# Patient Record
Sex: MALE | Race: BLACK OR AFRICAN AMERICAN | NOT HISPANIC OR LATINO | Employment: OTHER | URBAN - METROPOLITAN AREA
[De-identification: names, ages, dates, MRNs, and addresses within clinical notes are randomized per-mention and may not be internally consistent; named-entity substitution may affect disease eponyms.]

---

## 2023-11-29 ENCOUNTER — HOSPITAL ENCOUNTER (EMERGENCY)
Facility: HOSPITAL | Age: 58
Discharge: HOME | End: 2023-11-29
Payer: COMMERCIAL

## 2023-11-29 VITALS
RESPIRATION RATE: 18 BRPM | WEIGHT: 206 LBS | SYSTOLIC BLOOD PRESSURE: 151 MMHG | BODY MASS INDEX: 31.22 KG/M2 | OXYGEN SATURATION: 96 % | DIASTOLIC BLOOD PRESSURE: 96 MMHG | TEMPERATURE: 97 F | HEART RATE: 88 BPM | HEIGHT: 68 IN

## 2023-11-29 DIAGNOSIS — K64.9 HEMORRHOIDS, UNSPECIFIED HEMORRHOID TYPE: Primary | ICD-10-CM

## 2023-11-29 PROCEDURE — 99283 EMERGENCY DEPT VISIT LOW MDM: CPT

## 2023-11-29 PROCEDURE — 99284 EMERGENCY DEPT VISIT MOD MDM: CPT

## 2023-11-29 RX ORDER — DICYCLOMINE HYDROCHLORIDE 10 MG/1
10 CAPSULE ORAL ONCE
Status: DISCONTINUED | OUTPATIENT
Start: 2023-11-29 | End: 2023-11-29 | Stop reason: HOSPADM

## 2023-11-29 RX ORDER — DICYCLOMINE HYDROCHLORIDE 20 MG/1
10 TABLET ORAL
Qty: 30 TABLET | Refills: 0 | Status: SHIPPED | OUTPATIENT
Start: 2023-11-29

## 2023-11-29 RX ORDER — POLYETHYLENE GLYCOL 3350 17 G/17G
17 POWDER, FOR SOLUTION ORAL DAILY
Qty: 10 PACKET | Refills: 0 | Status: SHIPPED | OUTPATIENT
Start: 2023-11-29 | End: 2023-12-09

## 2023-11-29 ASSESSMENT — PAIN DESCRIPTION - LOCATION: LOCATION: ABDOMEN

## 2023-11-29 ASSESSMENT — COLUMBIA-SUICIDE SEVERITY RATING SCALE - C-SSRS
2. HAVE YOU ACTUALLY HAD ANY THOUGHTS OF KILLING YOURSELF?: NO
6. HAVE YOU EVER DONE ANYTHING, STARTED TO DO ANYTHING, OR PREPARED TO DO ANYTHING TO END YOUR LIFE?: NO
1. IN THE PAST MONTH, HAVE YOU WISHED YOU WERE DEAD OR WISHED YOU COULD GO TO SLEEP AND NOT WAKE UP?: NO

## 2023-11-29 ASSESSMENT — PAIN SCALES - GENERAL: PAINLEVEL_OUTOF10: 5 - MODERATE PAIN

## 2023-11-29 ASSESSMENT — PAIN - FUNCTIONAL ASSESSMENT: PAIN_FUNCTIONAL_ASSESSMENT: 0-10

## 2023-11-29 NOTE — ED PROVIDER NOTES
HPI   Chief Complaint   Patient presents with    Black or Bloody Stool       Limitations to History: None    HPI: This is a 58-year-old male with a past medical history of hypertension with poor medication compliance, alcohol abuse who presents to the emergency room with concerns for abdominal pain and rectal bleeding.  Patient states that he has been pushing really hard for his bowel movements over the last 3 to 4 days.  Patient states that on 2 separate occasions, he has noticed blood when wiping.  Patient endorses some mild abdominal pain without nausea or vomiting.  Patient denies any urinary symptoms such as frequency, hesitancy, dysuria, or hematuria.  Patient denies any constitutional symptoms such as fevers, chills, cough, or nasal congestion.    Additional History Obtained from: None    12 point review of systems was performed and is negative unless otherwise specified    ------------------------------------------------------------------------------------------------------------------------------------------  Physical Exam:    VS: As documented in the triage note and EMR flowsheet from this visit were reviewed.    CONSTITUTIONAL: Well appearing, well nourished, awake, alert, oriented to person, place, time/situation and in no apparent distress.   EYES: Clear bilaterally, pupils equal, round and reactive to light.   CARDIOVASCULAR: Normal rate, regular rhythm.  Heart sounds S1, S2.  No murmurs, rubs or gallops.  RESPIRATORY: Breath sounds clear and equal bilaterally. No wheezes or rhonchi.   GASTROINTESTINAL: Abdomen soft, non-distended, no rebound, no guarding.  No tenderness to palpation in all 4 quadrants.  No suprapubic or CVA tenderness.  Negative McBurney's point, Rovsing, psoas, Flores sign.  A rectal exam was performed on patient.  Patient has normal external rectal anatomy without fissures or tears.  Patient did refuse the digital manual exam.  MUSCULOSKELETAL: Spine appears normal, range of motion  is not limited, no muscle or joint tenderness.   NEUROLOGICAL: Alert and oriented, no focal deficits, no motor or sensory deficits.   SKIN: Skin normal color for race, warm, dry and intact. No evidence of trauma.   PSYCHIATRIC: Alert and oriented to person, place, time/situation. normal mood and affect. No apparent risk to self or others.     ------------------------------------------------------------------------------------------------------------------------------------------    Medical Decision Making:    This is a 58-year-old male who presents to the emergency room with 2 episodes of bright red blood per rectum.  Patient remained stable throughout course of care.  Patient did refuse the rectal exam which is limiting to patient's overall care.  Abdominal exam is benign lowers my concern for cholecystitis, pancreatitis, appendicitis, or bowel obstruction.  I did consider a CT angio to evaluate for a GI bleed, however based on my abdominal exam and rectal exam, I have lower concern for brisk GI bleed.  Based on patient's history, it is likely that patient has developed hemorrhoids from excessive straining.  Patient could also have constipation, leading to excessive wiping which leads to bleeding.  In either scenario, I do find it unlikely that patient has developed a brisk GI bleed.  Patient was administered 1 dose of Bentyl.  Patient is able to ambulate, responds to commands, engages in conversation, and understands his clinical presentation.  In my evaluation, this patient does have decision-making capacity.  Patient was reassessed and did have an improvement of symptoms.  Patient will be discharged home with prescriptions for Bentyl and MiraLAX.  I did offer patient hemorrhoid cream but patient refused, stating that he does not have a hemorrhoid.  I did recommend outpatient follow-up with a GI specialist if there is no improvement of symptoms.  Patient will be discharged home.  Patient understands that if  symptoms worsen or change in any way, they should return for immediate medical attention.  Patient understands that they should follow-up with internal hemorrhoid.  Patient was stable upon discharge.      External Records Reviewed: I reviewed recent and relevant outside records including: Previous provider notes      Escalation of Care:  Appropriate for outpatient follow-up with primary care provider, gastroenterologist    Social Determinants Affecting Care:  None    Prescription Drug Consideration: Bentyl, MiraLAX    Diagnostic testing considered: CT angio of the abdomen to evaluate for GI bleed      NEIL Pradhan PA-C  [facility]                            Jon Coma Scale Score: 15                  Patient History   Past Medical History:   Diagnosis Date    Hypertension      History reviewed. No pertinent surgical history.  No family history on file.  Social History     Tobacco Use    Smoking status: Never    Smokeless tobacco: Never   Substance Use Topics    Alcohol use: Not on file    Drug use: Not on file       Physical Exam   ED Triage Vitals [11/29/23 1216]   Temp Heart Rate Resp BP   36.1 °C (97 °F) 88 18 (!) 151/96      SpO2 Temp src Heart Rate Source Patient Position   96 % -- -- --      BP Location FiO2 (%)     -- --       Physical Exam    ED Course & MDM   Diagnoses as of 11/29/23 1426   Hemorrhoids, unspecified hemorrhoid type       Medical Decision Making      Procedure  Procedures     Dima Adames PA-C  11/29/23 1421       Dima Adames PA-C  11/29/23 1426

## 2023-11-30 NOTE — ED NOTES
[facility] ED NOTE 11/30/23  Patient is being provided an PCP appointment for 12/27/23 @ 1p  [facility]  With Dr. Steve Garcia  [phone]    Patient has appointment reminders on his profile     [facility]  11/30/23 1224

## 2023-12-27 ENCOUNTER — APPOINTMENT (OUTPATIENT)
Dept: PRIMARY CARE | Facility: HOSPITAL | Age: 58
End: 2023-12-27
Payer: COMMERCIAL

## 2024-02-20 ENCOUNTER — APPOINTMENT (OUTPATIENT)
Dept: GASTROENTEROLOGY | Facility: HOSPITAL | Age: 59
End: 2024-02-20
Payer: MEDICAID

## 2025-06-15 ENCOUNTER — HOSPITAL ENCOUNTER (EMERGENCY)
Facility: HOSPITAL | Age: 60
Discharge: HOME | End: 2025-06-15
Payer: MEDICAID

## 2025-06-15 ENCOUNTER — APPOINTMENT (OUTPATIENT)
Dept: RADIOLOGY | Facility: HOSPITAL | Age: 60
End: 2025-06-15
Payer: MEDICAID

## 2025-06-15 VITALS
OXYGEN SATURATION: 99 % | HEART RATE: 84 BPM | RESPIRATION RATE: 16 BRPM | TEMPERATURE: 97.2 F | DIASTOLIC BLOOD PRESSURE: 81 MMHG | SYSTOLIC BLOOD PRESSURE: 128 MMHG

## 2025-06-15 DIAGNOSIS — M25.571 ACUTE RIGHT ANKLE PAIN: Primary | ICD-10-CM

## 2025-06-15 PROCEDURE — 73610 X-RAY EXAM OF ANKLE: CPT | Mod: RT

## 2025-06-15 PROCEDURE — 73610 X-RAY EXAM OF ANKLE: CPT | Mod: RIGHT SIDE | Performed by: RADIOLOGY

## 2025-06-15 PROCEDURE — 99284 EMERGENCY DEPT VISIT MOD MDM: CPT

## 2025-06-15 PROCEDURE — 2500000001 HC RX 250 WO HCPCS SELF ADMINISTERED DRUGS (ALT 637 FOR MEDICARE OP)

## 2025-06-15 PROCEDURE — 99283 EMERGENCY DEPT VISIT LOW MDM: CPT

## 2025-06-15 RX ORDER — IBUPROFEN 600 MG/1
600 TABLET, FILM COATED ORAL ONCE
Status: DISCONTINUED | OUTPATIENT
Start: 2025-06-15 | End: 2025-06-15 | Stop reason: HOSPADM

## 2025-06-15 NOTE — ED PROVIDER NOTES
History of Present Illness       History provided by: Patient  Limitations to History: None  External Records Reviewed with Brief Summary: notes reviewed in patients chart    HPI:  HPI     This is a 6-year-old male with past medical history of hypertension presenting to the ED for right ankle pain that began yesterday around 6 PM.  Patient states he was with his grandchildren trying to fly kite when he twisted his ankle awkwardly developing pain.  States he did take Tylenol earlier today.  Denies pain within the digit of the right foot as well as the right lower and upper leg.  Denies chest pain, shortness of breath, fever, chills, nausea, vomiting.  Patient able to ambulate although fevers that leg.    Physical Exam   Physical Exam  Constitutional:       Appearance: Normal appearance.   HENT:      Head: Normocephalic.      Nose: Nose normal.      Mouth/Throat:      Mouth: Mucous membranes are moist.   Eyes:      Extraocular Movements: Extraocular movements intact.   Cardiovascular:      Rate and Rhythm: Normal rate and regular rhythm.   Pulmonary:      Effort: Pulmonary effort is normal.      Breath sounds: Normal breath sounds.   Musculoskeletal:      Cervical back: Normal range of motion.      Comments: Right ankle slightly tender to palpation along the lateral, anterior, medial portion with mild swelling.  Palpable pulses.  Skin intact.  No palpable deformities.  Normal ROM of digits, right ankle slightly limited due to pain. Compartments soft    Skin:     General: Skin is warm.   Neurological:      General: No focal deficit present.      Mental Status: He is alert and oriented to person, place, and time.   Psychiatric:         Mood and Affect: Mood normal.         Behavior: Behavior normal.         Thought Content: Thought content normal.          Triage vitals:  T 36.2 °C (97.2 °F)  HR 84  /81  RR 16  O2 99 % None (Room air)    Medical Decision Making & ED Course     ED Course & MDM       Medical  Decision Making:  Medical Decision Making  This is a 6-year-old male with past medical history of hypertension presenting to the ED for right ankle pain that began yesterday around 6 PM.    Upon arrival to the ED patient is alert and oriented in no acute distress, vitally stable and afebrile.  On exam patient does have a slightly swollen right ankle, tender to palpation over the lateral, anterior, medial aspect.  Skin intact.  ROM slightly limited due to pain.  Compartments soft.    Patient offered ibuprofen, declined.  Right ankle x-ray without acute bony abnormality.    Patient discharged in stable condition, discussed ankle sprain, with Ace wrap applied over the ankle.  Advised patient to take Tylenol/ibuprofen as needed for pain management.  Wear the Ace wrap for compression.  Apply ice and elevate the ankle.  Follow-up with orthopedic team.  Return to ED for new or worsening of symptoms.        Visit Vitals  /81   Pulse 84   Temp 36.2 °C (97.2 °F) (Temporal)   Resp 16   SpO2 99%   Smoking Status Never        Labs Reviewed - No data to display    No orders to display       ED Course:  Diagnoses as of 06/15/25 1147   Acute right ankle pain     Disposition     As a result of the work-up, the patient was discharged home.  he was informed of his diagnosis and instructed to come back with any concerns or worsening of condition.  he was agreeable to the plan as discussed above.  he was given the opportunity to ask questions.  All of the patient's questions were answered.      Procedures   Procedures    Patient was seen independently    Amarilys Dumont PA-C  Emergency Medicine      Amarilys Dumont PA-C  06/15/25 1526

## 2025-06-15 NOTE — DISCHARGE INSTRUCTIONS
You were seen here for right ankle pain.  X-ray does not demonstrate any acute fracture or bony abnormality.  I recommend that you take Tylenol/ibuprofen as needed for pain management.  Rest and ice the ankle, apply Ace wrap for compression, stay well-hydrated.  Follow-up with orthopedic team.  Return to ED for new or worsening of symptoms.

## 2025-07-19 ENCOUNTER — CLINICAL SUPPORT (OUTPATIENT)
Dept: EMERGENCY MEDICINE | Facility: HOSPITAL | Age: 60
End: 2025-07-19
Payer: COMMERCIAL

## 2025-07-19 ENCOUNTER — APPOINTMENT (OUTPATIENT)
Dept: RADIOLOGY | Facility: HOSPITAL | Age: 60
End: 2025-07-19
Payer: COMMERCIAL

## 2025-07-19 ENCOUNTER — HOSPITAL ENCOUNTER (INPATIENT)
Facility: HOSPITAL | Age: 60
LOS: 1 days | Discharge: HOME | End: 2025-07-20
Attending: STUDENT IN AN ORGANIZED HEALTH CARE EDUCATION/TRAINING PROGRAM | Admitting: INTERNAL MEDICINE
Payer: COMMERCIAL

## 2025-07-19 DIAGNOSIS — R10.33 PERIUMBILICAL ABDOMINAL PAIN: ICD-10-CM

## 2025-07-19 DIAGNOSIS — F10.90 ALCOHOL USE: ICD-10-CM

## 2025-07-19 DIAGNOSIS — K86.1 ACUTE ON CHRONIC PANCREATITIS (MULTI): Primary | ICD-10-CM

## 2025-07-19 DIAGNOSIS — K85.90 ACUTE ON CHRONIC PANCREATITIS (MULTI): Primary | ICD-10-CM

## 2025-07-19 LAB
ALBUMIN SERPL BCP-MCNC: 4.4 G/DL (ref 3.4–5)
ALP SERPL-CCNC: 108 U/L (ref 33–136)
ALT SERPL W P-5'-P-CCNC: 21 U/L (ref 10–52)
ANION GAP SERPL CALC-SCNC: 17 MMOL/L (ref 10–20)
AST SERPL W P-5'-P-CCNC: 32 U/L (ref 9–39)
BASOPHILS # BLD AUTO: 0.03 X10*3/UL (ref 0–0.1)
BASOPHILS NFR BLD AUTO: 0.3 %
BILIRUB SERPL-MCNC: 1.3 MG/DL (ref 0–1.2)
BUN SERPL-MCNC: 9 MG/DL (ref 6–23)
CALCIUM SERPL-MCNC: 9.3 MG/DL (ref 8.6–10.6)
CHLORIDE SERPL-SCNC: 99 MMOL/L (ref 98–107)
CO2 SERPL-SCNC: 25 MMOL/L (ref 21–32)
CREAT SERPL-MCNC: 1.21 MG/DL (ref 0.5–1.3)
EGFRCR SERPLBLD CKD-EPI 2021: 69 ML/MIN/1.73M*2
EOSINOPHIL # BLD AUTO: 0 X10*3/UL (ref 0–0.7)
EOSINOPHIL NFR BLD AUTO: 0 %
ERYTHROCYTE [DISTWIDTH] IN BLOOD BY AUTOMATED COUNT: 12.1 % (ref 11.5–14.5)
GLUCOSE SERPL-MCNC: 159 MG/DL (ref 74–99)
HCT VFR BLD AUTO: 33.5 % (ref 41–52)
HGB BLD-MCNC: 12.2 G/DL (ref 13.5–17.5)
IMM GRANULOCYTES # BLD AUTO: 0.03 X10*3/UL (ref 0–0.7)
IMM GRANULOCYTES NFR BLD AUTO: 0.3 % (ref 0–0.9)
LACTATE BLDV-SCNC: 1.2 MMOL/L (ref 0.4–2)
LIPASE SERPL-CCNC: 516 U/L (ref 9–82)
LYMPHOCYTES # BLD AUTO: 0.61 X10*3/UL (ref 1.2–4.8)
LYMPHOCYTES NFR BLD AUTO: 6.2 %
MAGNESIUM SERPL-MCNC: 1.54 MG/DL (ref 1.6–2.4)
MCH RBC QN AUTO: 32.9 PG (ref 26–34)
MCHC RBC AUTO-ENTMCNC: 36.4 G/DL (ref 32–36)
MCV RBC AUTO: 90 FL (ref 80–100)
MONOCYTES # BLD AUTO: 0.33 X10*3/UL (ref 0.1–1)
MONOCYTES NFR BLD AUTO: 3.3 %
NEUTROPHILS # BLD AUTO: 8.87 X10*3/UL (ref 1.2–7.7)
NEUTROPHILS NFR BLD AUTO: 89.9 %
NRBC BLD-RTO: 0 /100 WBCS (ref 0–0)
PHOSPHATE SERPL-MCNC: 3.4 MG/DL (ref 2.5–4.9)
PLATELET # BLD AUTO: 234 X10*3/UL (ref 150–450)
POTASSIUM SERPL-SCNC: 2.6 MMOL/L (ref 3.5–5.3)
PROT SERPL-MCNC: 7.7 G/DL (ref 6.4–8.2)
RBC # BLD AUTO: 3.71 X10*6/UL (ref 4.5–5.9)
SODIUM SERPL-SCNC: 138 MMOL/L (ref 136–145)
WBC # BLD AUTO: 9.9 X10*3/UL (ref 4.4–11.3)

## 2025-07-19 PROCEDURE — 96365 THER/PROPH/DIAG IV INF INIT: CPT

## 2025-07-19 PROCEDURE — 96375 TX/PRO/DX INJ NEW DRUG ADDON: CPT

## 2025-07-19 PROCEDURE — 96368 THER/DIAG CONCURRENT INF: CPT

## 2025-07-19 PROCEDURE — 74177 CT ABD & PELVIS W/CONTRAST: CPT | Performed by: RADIOLOGY

## 2025-07-19 PROCEDURE — 80053 COMPREHEN METABOLIC PANEL: CPT | Performed by: STUDENT IN AN ORGANIZED HEALTH CARE EDUCATION/TRAINING PROGRAM

## 2025-07-19 PROCEDURE — 83735 ASSAY OF MAGNESIUM: CPT | Performed by: STUDENT IN AN ORGANIZED HEALTH CARE EDUCATION/TRAINING PROGRAM

## 2025-07-19 PROCEDURE — 2500000001 HC RX 250 WO HCPCS SELF ADMINISTERED DRUGS (ALT 637 FOR MEDICARE OP): Performed by: STUDENT IN AN ORGANIZED HEALTH CARE EDUCATION/TRAINING PROGRAM

## 2025-07-19 PROCEDURE — 96365 THER/PROPH/DIAG IV INF INIT: CPT | Mod: 59

## 2025-07-19 PROCEDURE — 93010 ELECTROCARDIOGRAM REPORT: CPT | Performed by: STUDENT IN AN ORGANIZED HEALTH CARE EDUCATION/TRAINING PROGRAM

## 2025-07-19 PROCEDURE — 2500000004 HC RX 250 GENERAL PHARMACY W/ HCPCS (ALT 636 FOR OP/ED)

## 2025-07-19 PROCEDURE — 96376 TX/PRO/DX INJ SAME DRUG ADON: CPT

## 2025-07-19 PROCEDURE — 99223 1ST HOSP IP/OBS HIGH 75: CPT

## 2025-07-19 PROCEDURE — 96376 TX/PRO/DX INJ SAME DRUG ADON: CPT | Mod: 59

## 2025-07-19 PROCEDURE — 82248 BILIRUBIN DIRECT: CPT

## 2025-07-19 PROCEDURE — 84100 ASSAY OF PHOSPHORUS: CPT | Performed by: STUDENT IN AN ORGANIZED HEALTH CARE EDUCATION/TRAINING PROGRAM

## 2025-07-19 PROCEDURE — 1200000002 HC GENERAL ROOM WITH TELEMETRY DAILY

## 2025-07-19 PROCEDURE — 93005 ELECTROCARDIOGRAM TRACING: CPT

## 2025-07-19 PROCEDURE — 99285 EMERGENCY DEPT VISIT HI MDM: CPT | Performed by: STUDENT IN AN ORGANIZED HEALTH CARE EDUCATION/TRAINING PROGRAM

## 2025-07-19 PROCEDURE — 2500000004 HC RX 250 GENERAL PHARMACY W/ HCPCS (ALT 636 FOR OP/ED): Mod: JZ | Performed by: STUDENT IN AN ORGANIZED HEALTH CARE EDUCATION/TRAINING PROGRAM

## 2025-07-19 PROCEDURE — 1210000001 HC SEMI-PRIVATE ROOM DAILY

## 2025-07-19 PROCEDURE — 83690 ASSAY OF LIPASE: CPT | Performed by: STUDENT IN AN ORGANIZED HEALTH CARE EDUCATION/TRAINING PROGRAM

## 2025-07-19 PROCEDURE — 2500000005 HC RX 250 GENERAL PHARMACY W/O HCPCS: Performed by: STUDENT IN AN ORGANIZED HEALTH CARE EDUCATION/TRAINING PROGRAM

## 2025-07-19 PROCEDURE — 2550000001 HC RX 255 CONTRASTS: Performed by: STUDENT IN AN ORGANIZED HEALTH CARE EDUCATION/TRAINING PROGRAM

## 2025-07-19 PROCEDURE — 99285 EMERGENCY DEPT VISIT HI MDM: CPT | Mod: 25 | Performed by: STUDENT IN AN ORGANIZED HEALTH CARE EDUCATION/TRAINING PROGRAM

## 2025-07-19 PROCEDURE — 83605 ASSAY OF LACTIC ACID: CPT | Performed by: STUDENT IN AN ORGANIZED HEALTH CARE EDUCATION/TRAINING PROGRAM

## 2025-07-19 PROCEDURE — 85025 COMPLETE CBC W/AUTO DIFF WBC: CPT | Performed by: STUDENT IN AN ORGANIZED HEALTH CARE EDUCATION/TRAINING PROGRAM

## 2025-07-19 PROCEDURE — 96366 THER/PROPH/DIAG IV INF ADDON: CPT

## 2025-07-19 PROCEDURE — 2500000002 HC RX 250 W HCPCS SELF ADMINISTERED DRUGS (ALT 637 FOR MEDICARE OP, ALT 636 FOR OP/ED): Performed by: STUDENT IN AN ORGANIZED HEALTH CARE EDUCATION/TRAINING PROGRAM

## 2025-07-19 PROCEDURE — 36415 COLL VENOUS BLD VENIPUNCTURE: CPT | Performed by: STUDENT IN AN ORGANIZED HEALTH CARE EDUCATION/TRAINING PROGRAM

## 2025-07-19 PROCEDURE — 74177 CT ABD & PELVIS W/CONTRAST: CPT

## 2025-07-19 PROCEDURE — 96374 THER/PROPH/DIAG INJ IV PUSH: CPT | Mod: 59

## 2025-07-19 RX ORDER — POLYETHYLENE GLYCOL 3350 17 G/17G
17 POWDER, FOR SOLUTION ORAL DAILY PRN
Status: DISCONTINUED | OUTPATIENT
Start: 2025-07-19 | End: 2025-07-20 | Stop reason: HOSPADM

## 2025-07-19 RX ORDER — MORPHINE SULFATE 4 MG/ML
4 INJECTION INTRAVENOUS ONCE
Status: COMPLETED | OUTPATIENT
Start: 2025-07-19 | End: 2025-07-19

## 2025-07-19 RX ORDER — ENOXAPARIN SODIUM 100 MG/ML
40 INJECTION SUBCUTANEOUS EVERY 24 HOURS
Status: DISCONTINUED | OUTPATIENT
Start: 2025-07-20 | End: 2025-07-20 | Stop reason: HOSPADM

## 2025-07-19 RX ORDER — PROCHLORPERAZINE MALEATE 10 MG
10 TABLET ORAL EVERY 6 HOURS PRN
Status: DISCONTINUED | OUTPATIENT
Start: 2025-07-19 | End: 2025-07-20 | Stop reason: HOSPADM

## 2025-07-19 RX ORDER — ONDANSETRON HYDROCHLORIDE 2 MG/ML
4 INJECTION, SOLUTION INTRAVENOUS ONCE
Status: COMPLETED | OUTPATIENT
Start: 2025-07-19 | End: 2025-07-19

## 2025-07-19 RX ORDER — ONDANSETRON 4 MG/1
4 TABLET, FILM COATED ORAL EVERY 6 HOURS PRN
Status: DISCONTINUED | OUTPATIENT
Start: 2025-07-19 | End: 2025-07-20 | Stop reason: HOSPADM

## 2025-07-19 RX ORDER — OXYCODONE HYDROCHLORIDE 5 MG/1
5 TABLET ORAL EVERY 6 HOURS PRN
Status: DISCONTINUED | OUTPATIENT
Start: 2025-07-19 | End: 2025-07-20 | Stop reason: HOSPADM

## 2025-07-19 RX ORDER — ALUMINUM HYDROXIDE, MAGNESIUM HYDROXIDE, AND SIMETHICONE 1200; 120; 1200 MG/30ML; MG/30ML; MG/30ML
30 SUSPENSION ORAL ONCE
Status: COMPLETED | OUTPATIENT
Start: 2025-07-19 | End: 2025-07-19

## 2025-07-19 RX ORDER — POTASSIUM CHLORIDE 20 MEQ/1
40 TABLET, EXTENDED RELEASE ORAL ONCE
Status: DISCONTINUED | OUTPATIENT
Start: 2025-07-19 | End: 2025-07-19

## 2025-07-19 RX ORDER — FAMOTIDINE 20 MG/1
20 TABLET, FILM COATED ORAL 2 TIMES DAILY
Status: DISCONTINUED | OUTPATIENT
Start: 2025-07-20 | End: 2025-07-20

## 2025-07-19 RX ORDER — LIDOCAINE HYDROCHLORIDE 20 MG/ML
15 SOLUTION OROPHARYNGEAL ONCE
Status: COMPLETED | OUTPATIENT
Start: 2025-07-19 | End: 2025-07-19

## 2025-07-19 RX ORDER — AMLODIPINE BESYLATE 10 MG/1
10 TABLET ORAL DAILY
Status: DISCONTINUED | OUTPATIENT
Start: 2025-07-20 | End: 2025-07-20 | Stop reason: HOSPADM

## 2025-07-19 RX ORDER — FAMOTIDINE 10 MG/ML
20 INJECTION, SOLUTION INTRAVENOUS ONCE
Status: COMPLETED | OUTPATIENT
Start: 2025-07-19 | End: 2025-07-19

## 2025-07-19 RX ORDER — ACETAMINOPHEN 325 MG/1
975 TABLET ORAL EVERY 8 HOURS
Status: DISCONTINUED | OUTPATIENT
Start: 2025-07-20 | End: 2025-07-20 | Stop reason: HOSPADM

## 2025-07-19 RX ORDER — PROCHLORPERAZINE EDISYLATE 5 MG/ML
10 INJECTION INTRAMUSCULAR; INTRAVENOUS EVERY 6 HOURS PRN
Status: DISCONTINUED | OUTPATIENT
Start: 2025-07-19 | End: 2025-07-20 | Stop reason: HOSPADM

## 2025-07-19 RX ORDER — POTASSIUM CHLORIDE 14.9 MG/ML
20 INJECTION INTRAVENOUS
Status: COMPLETED | OUTPATIENT
Start: 2025-07-19 | End: 2025-07-20

## 2025-07-19 RX ORDER — FAMOTIDINE 10 MG/ML
20 INJECTION, SOLUTION INTRAVENOUS 2 TIMES DAILY
Status: DISCONTINUED | OUTPATIENT
Start: 2025-07-20 | End: 2025-07-20

## 2025-07-19 RX ORDER — ONDANSETRON HYDROCHLORIDE 2 MG/ML
4 INJECTION, SOLUTION INTRAVENOUS EVERY 6 HOURS PRN
Status: DISCONTINUED | OUTPATIENT
Start: 2025-07-19 | End: 2025-07-20 | Stop reason: HOSPADM

## 2025-07-19 RX ORDER — SODIUM CHLORIDE, SODIUM LACTATE, POTASSIUM CHLORIDE, CALCIUM CHLORIDE 600; 310; 30; 20 MG/100ML; MG/100ML; MG/100ML; MG/100ML
150 INJECTION, SOLUTION INTRAVENOUS CONTINUOUS
Status: DISCONTINUED | OUTPATIENT
Start: 2025-07-20 | End: 2025-07-20

## 2025-07-19 RX ORDER — MAGNESIUM SULFATE HEPTAHYDRATE 40 MG/ML
2 INJECTION, SOLUTION INTRAVENOUS ONCE
Status: COMPLETED | OUTPATIENT
Start: 2025-07-19 | End: 2025-07-19

## 2025-07-19 RX ORDER — ACETAMINOPHEN 325 MG/1
975 TABLET ORAL ONCE
Status: COMPLETED | OUTPATIENT
Start: 2025-07-19 | End: 2025-07-19

## 2025-07-19 RX ORDER — TALC
3 POWDER (GRAM) TOPICAL NIGHTLY PRN
Status: DISCONTINUED | OUTPATIENT
Start: 2025-07-19 | End: 2025-07-20 | Stop reason: HOSPADM

## 2025-07-19 RX ORDER — PROCHLORPERAZINE 25 MG/1
25 SUPPOSITORY RECTAL EVERY 12 HOURS PRN
Status: DISCONTINUED | OUTPATIENT
Start: 2025-07-19 | End: 2025-07-20 | Stop reason: HOSPADM

## 2025-07-19 RX ORDER — MORPHINE SULFATE 4 MG/ML
2 INJECTION INTRAVENOUS ONCE
Status: COMPLETED | OUTPATIENT
Start: 2025-07-19 | End: 2025-07-19

## 2025-07-19 RX ADMIN — POTASSIUM CHLORIDE 20 MEQ: 14.9 INJECTION, SOLUTION INTRAVENOUS at 22:05

## 2025-07-19 RX ADMIN — POTASSIUM CHLORIDE 20 MEQ: 14.9 INJECTION, SOLUTION INTRAVENOUS at 19:28

## 2025-07-19 RX ADMIN — ACETAMINOPHEN 975 MG: 325 TABLET ORAL at 16:41

## 2025-07-19 RX ADMIN — IOHEXOL 80 ML: 350 INJECTION, SOLUTION INTRAVENOUS at 21:52

## 2025-07-19 RX ADMIN — MORPHINE SULFATE 2 MG: 4 INJECTION INTRAVENOUS at 16:40

## 2025-07-19 RX ADMIN — MORPHINE SULFATE 4 MG: 4 INJECTION INTRAVENOUS at 22:47

## 2025-07-19 RX ADMIN — FAMOTIDINE 20 MG: 10 INJECTION INTRAVENOUS at 16:40

## 2025-07-19 RX ADMIN — MAGNESIUM SULFATE HEPTAHYDRATE 2 G: 40 INJECTION, SOLUTION INTRAVENOUS at 18:44

## 2025-07-19 RX ADMIN — ALUMINUM HYDROXIDE, MAGNESIUM HYDROXIDE, AND SIMETHICONE 30 ML: 200; 200; 20 SUSPENSION ORAL at 16:40

## 2025-07-19 RX ADMIN — ONDANSETRON 4 MG: 2 INJECTION INTRAMUSCULAR; INTRAVENOUS at 16:40

## 2025-07-19 RX ADMIN — LIDOCAINE HYDROCHLORIDE 15 ML: 20 SOLUTION ORAL at 16:40

## 2025-07-19 RX ADMIN — MORPHINE SULFATE 4 MG: 4 INJECTION INTRAVENOUS at 18:44

## 2025-07-19 RX ADMIN — SODIUM CHLORIDE, SODIUM LACTATE, POTASSIUM CHLORIDE, AND CALCIUM CHLORIDE 1000 ML: .6; .31; .03; .02 INJECTION, SOLUTION INTRAVENOUS at 18:44

## 2025-07-19 ASSESSMENT — LIFESTYLE VARIABLES
HAVE YOU EVER FELT YOU SHOULD CUT DOWN ON YOUR DRINKING: NO
EVER HAD A DRINK FIRST THING IN THE MORNING TO STEADY YOUR NERVES TO GET RID OF A HANGOVER: NO
EVER FELT BAD OR GUILTY ABOUT YOUR DRINKING: NO
TOTAL SCORE: 0
HAVE PEOPLE ANNOYED YOU BY CRITICIZING YOUR DRINKING: NO

## 2025-07-19 ASSESSMENT — PAIN SCALES - GENERAL
PAINLEVEL_OUTOF10: 10 - WORST POSSIBLE PAIN
PAINLEVEL_OUTOF10: 0 - NO PAIN
PAINLEVEL_OUTOF10: 4
PAINLEVEL_OUTOF10: 0 - NO PAIN

## 2025-07-19 ASSESSMENT — PAIN DESCRIPTION - LOCATION
LOCATION: ABDOMEN

## 2025-07-19 ASSESSMENT — PAIN DESCRIPTION - PAIN TYPE
TYPE: ACUTE PAIN
TYPE: ACUTE PAIN

## 2025-07-19 ASSESSMENT — PAIN - FUNCTIONAL ASSESSMENT
PAIN_FUNCTIONAL_ASSESSMENT: 0-10

## 2025-07-19 ASSESSMENT — PAIN DESCRIPTION - ORIENTATION
ORIENTATION: MID
ORIENTATION: MID

## 2025-07-19 NOTE — ED TRIAGE NOTES
"Pt brought via ems for abd pain. Pt points to the middle of his abdomen and says the pain is 10/10. He endorses burping a lot. Says he feels like \"there is a bubble in his stomach.\" Endorses some nausea but no vomiting. Denies cp, sob. A.ox4, vitals stable.  "

## 2025-07-19 NOTE — ED PROVIDER NOTES
Emergency Department Provider Note       History of Present Illness     History provided by: Patient and EMS  Limitations to History: None  External Records Reviewed with Brief Summary: Discharge Summary from 2/3/25 which showed pt was seen in the ED for concerns of right foot pain. Pt was diagnosed with a sprain and discharged home.     HPI:  Venkat Rod is a 60 y.o. male with PMHx of HTN, alcohol use disorder who presents to the ED via EMS for evaluation of acute onset severe midline abdominal pain. Pt states that he has not eaten today and had an acute onset of severe abdominal pain around 1200 today. Pt states he has never had pain like this before, noting that his present pain is a 10/10. Pt had two hernias, one above and one below the umbilicus, that were repaired in the past. Pt denies any worsening of sx with greasy food or position. Pt reports feeling a bubble above the umbilicus that is tender to touch with an associated increase belching and nausea. Pt had a normal bowel movement last night that was non-bloody. Pt denies any CP, SOB, vomiting, diarrhea, headache, syncope, hematochezia, melena.   Pt reports that he did consume 2 oxycodone pills that were prescribed to a friend yesterday. Pt has not taken any other pain medication.     Physical Exam   Triage vitals:  T 36.5 °C (97.7 °F)  HR 89  /83  RR 18  O2 98 % None (Room air)    General: Appears uncomfortable, nontoxic  Skin: Warm, dry, intact  HEENT: Moist mucous membranes, PERRLA, EOMI, no cervical lymphadenopathy  Cardiac: Regular rate, regular rhythm, no obvious murmurs  Pulm: Clear to auscultation in all lung fields, no crackles  Abd: Soft, nondistended, nontender in right upper, left upper, right lower, left lower quadrants, not tender in the epigastrium, tenderness over the prior superior healed incision approximately 1 inch above the umbilicus.  Mild swelling about the site.  No signs of erythema or streaking.  MSK: No deformities,  no rashes, moving all extremities  Psych: Appropriate mood, appropriate affect        Medical Decision Making & ED Course   Medical Decision Makin y.o. male with history of hypertension and repair of ventral hernia who presents to the emergency room complaining of acute onset of 10 out of 10 midline abdominal pain over the site of his prior hernia.  Patient states that he had breakfast late last night that consisted of Tam[employer] toast and pork and has not eaten anything since.  Patient was awoken around noon today with severe pain and nausea.  He has increased belching.  Patient had a normal bowel movement last night that was nonbloody.  Patient reports consuming 2 oxycodone pills that were prescribed to a friend yesterday however he is not taking any other pain medication.  Patient appears very uncomfortable and is laying in left lateral decubitus.  Patient is tender over his prior hernia repair incision with mild edema over the site.  He has nauseous at this time.  No other complaints, remainder of exam is benign.    Will obtain CT abdomen pelvis with IV contrast to evaluate for possible incarcerated hernia, intra-abdominal infection, obstruction, appendicitis, pancreatitis  Lab workup includes VBG lactic acid for evaluation of organ damage.  CBC to determine if there is an underlying leukocytosis.  CMP to evaluate for renal function.  Lipase to evaluate for pancreatitis.    Patient appears highly uncomfortable.  Will administer 975 mg of Tylenol and 2 mg morphine injection for pain control.  Will additionally administer GI cocktail for increased belching and 20 mg Pepcid for GI upset.  Patient is additionally endorsing some nausea at this time and will administer 4 mg Zofran IV.    Differential diagnoses considered include but are not limited to: Pancreatitis, incarcerated hernia, intra-abdominal infection, obstruction, ascites, cholelithiasis, cholecystitis    Social Determinants of Health which Significantly  Impact Care: Social Determinants of Health which Significantly Impact Care: None identified     EKG Independent Interpretation: The EKG obtained at 1612 was independently interpreted by myself. It demonstrates normal sinus rhythm with a ventricular rate of 88. Normal axis. Intervals within normal limits. ST segments showed no acute elevations or depressions. No prior EKG for comparison.     Independent Result Review and Interpretation: CT demonstrates acute on chronic pancreatitis with ill-define sylvia-pancreatic fluid.     Chronic conditions affecting the patient's care: As documented above in [facility]    The patient was discussed with the following consultants/services: Admission Coordinator who accepted the patient for admission    Care Considerations: As documented above in [facility]    ED Course:  ED Course as of 07/19/25 2251   Sat Jul 19, 2025   1621 On my interpretation EKG obtained at 1612 for abd pain HR 88  Normal sinus rhythm  Narrow QRS complex  Normal OH interval   No significant ST segment elevation  [FN]   1736 CBC and Auto Differential(!)  Unremarkable, no leukocytosis [NP]   1754 POCT Lactate, Venous: 1.2  normal [FN]   1801 SODIUM: 138 [FN]   1801 POTASSIUM(!!): 2.6  Likely caused by nausea vomiting.  Will replete orally and give magnesium supplementation as well [FN]   1801 LIPASE(!): 516  Concerning for pancreatitis.  Will give IV fluids [FN]   1808 Comprehensive metabolic panel(!!)  Patient is hypokalemic, will replete.  Evaluating for low magnesium, will prophylactically begin repletion. [NP]   1814 Lipase(!)  Concern for pancreatitis, patient does not have epigastric tenderness on evaluation.  Pending CT abdomen.  Will begin 1 L LR.  Patient still in exquisite pain, will offer patient additional morphine for pain control [NP]   1909 Patient states that his pain is slightly improved.  Still feeling nauseous and declines p.o. potassium repletion.  Will complete potassium repletion via IV [NP]   1909  Magnesium(!)  Low magnesium as expected.  Patient receiving repletion [NP]   2034 Son was updated at patients request. [phone] [NP]   2237 CT abdomen pelvis w IV contrast  Acute on chronic pancreatitis [NP]      ED Course User Index  [FN] Candy Fisher MD  [NP] Iram Tijerina DO         Diagnoses as of 07/19/25 2251   Acute on chronic pancreatitis (Multi)   Periumbilical abdominal pain       Disposition   As a result of their workup, the patient will require admission to the hospital.  The patient was informed of his diagnosis.  The patient was given the opportunity to ask questions and I answered them. The patient agreed to be admitted to the hospital.      This was a shared visit with an ED attending.  The patient was seen and discussed with the ED attending    Iram Tijerina DO  Emergency Medicine                                                       Iram Tijerina DO  Resident  07/19/25 2251

## 2025-07-20 VITALS
HEART RATE: 75 BPM | DIASTOLIC BLOOD PRESSURE: 64 MMHG | TEMPERATURE: 97.9 F | WEIGHT: 206 LBS | HEIGHT: 68 IN | BODY MASS INDEX: 31.22 KG/M2 | OXYGEN SATURATION: 98 % | SYSTOLIC BLOOD PRESSURE: 114 MMHG | RESPIRATION RATE: 17 BRPM

## 2025-07-20 LAB
ALBUMIN SERPL BCP-MCNC: 3.8 G/DL (ref 3.4–5)
ALP SERPL-CCNC: 98 U/L (ref 33–136)
ALT SERPL W P-5'-P-CCNC: 14 U/L (ref 10–52)
ANION GAP SERPL CALC-SCNC: 12 MMOL/L (ref 10–20)
ANION GAP SERPL CALC-SCNC: 14 MMOL/L (ref 10–20)
AST SERPL W P-5'-P-CCNC: 26 U/L (ref 9–39)
ATRIAL RATE: 88 BPM
BASOPHILS # BLD AUTO: 0.03 X10*3/UL (ref 0–0.1)
BASOPHILS NFR BLD AUTO: 0.4 %
BILIRUB DIRECT SERPL-MCNC: 0.2 MG/DL (ref 0–0.3)
BILIRUB DIRECT SERPL-MCNC: 0.3 MG/DL (ref 0–0.3)
BILIRUB SERPL-MCNC: 1.4 MG/DL (ref 0–1.2)
BUN SERPL-MCNC: 6 MG/DL (ref 6–23)
BUN SERPL-MCNC: 8 MG/DL (ref 6–23)
CALCIUM SERPL-MCNC: 9 MG/DL (ref 8.6–10.6)
CALCIUM SERPL-MCNC: 9 MG/DL (ref 8.6–10.6)
CHLORIDE SERPL-SCNC: 102 MMOL/L (ref 98–107)
CHLORIDE SERPL-SCNC: 98 MMOL/L (ref 98–107)
CO2 SERPL-SCNC: 26 MMOL/L (ref 21–32)
CO2 SERPL-SCNC: 28 MMOL/L (ref 21–32)
CREAT SERPL-MCNC: 1.03 MG/DL (ref 0.5–1.3)
CREAT SERPL-MCNC: 1.2 MG/DL (ref 0.5–1.3)
EGFRCR SERPLBLD CKD-EPI 2021: 69 ML/MIN/1.73M*2
EGFRCR SERPLBLD CKD-EPI 2021: 83 ML/MIN/1.73M*2
EOSINOPHIL # BLD AUTO: 0.04 X10*3/UL (ref 0–0.7)
EOSINOPHIL NFR BLD AUTO: 0.5 %
ERYTHROCYTE [DISTWIDTH] IN BLOOD BY AUTOMATED COUNT: 12.2 % (ref 11.5–14.5)
GLUCOSE SERPL-MCNC: 116 MG/DL (ref 74–99)
GLUCOSE SERPL-MCNC: 125 MG/DL (ref 74–99)
HCT VFR BLD AUTO: 35 % (ref 41–52)
HGB BLD-MCNC: 12 G/DL (ref 13.5–17.5)
IMM GRANULOCYTES # BLD AUTO: 0.02 X10*3/UL (ref 0–0.7)
IMM GRANULOCYTES NFR BLD AUTO: 0.3 % (ref 0–0.9)
LYMPHOCYTES # BLD AUTO: 1.29 X10*3/UL (ref 1.2–4.8)
LYMPHOCYTES NFR BLD AUTO: 16.9 %
MAGNESIUM SERPL-MCNC: 1.89 MG/DL (ref 1.6–2.4)
MCH RBC QN AUTO: 32.9 PG (ref 26–34)
MCHC RBC AUTO-ENTMCNC: 34.3 G/DL (ref 32–36)
MCV RBC AUTO: 96 FL (ref 80–100)
MONOCYTES # BLD AUTO: 0.37 X10*3/UL (ref 0.1–1)
MONOCYTES NFR BLD AUTO: 4.8 %
NEUTROPHILS # BLD AUTO: 5.89 X10*3/UL (ref 1.2–7.7)
NEUTROPHILS NFR BLD AUTO: 77.1 %
NRBC BLD-RTO: 0 /100 WBCS (ref 0–0)
P AXIS: 56 DEGREES
P OFFSET: 209 MS
P ONSET: 147 MS
PHOSPHATE SERPL-MCNC: 2.8 MG/DL (ref 2.5–4.9)
PLATELET # BLD AUTO: 233 X10*3/UL (ref 150–450)
POTASSIUM SERPL-SCNC: 2.9 MMOL/L (ref 3.5–5.3)
POTASSIUM SERPL-SCNC: 3.5 MMOL/L (ref 3.5–5.3)
PR INTERVAL: 150 MS
PROT SERPL-MCNC: 6.7 G/DL (ref 6.4–8.2)
Q ONSET: 222 MS
QRS COUNT: 14 BEATS
QRS DURATION: 94 MS
QT INTERVAL: 376 MS
QTC CALCULATION(BAZETT): 454 MS
QTC FREDERICIA: 427 MS
R AXIS: 23 DEGREES
RBC # BLD AUTO: 3.65 X10*6/UL (ref 4.5–5.9)
SODIUM SERPL-SCNC: 134 MMOL/L (ref 136–145)
SODIUM SERPL-SCNC: 139 MMOL/L (ref 136–145)
T AXIS: 63 DEGREES
T OFFSET: 410 MS
VENTRICULAR RATE: 88 BPM
WBC # BLD AUTO: 7.6 X10*3/UL (ref 4.4–11.3)

## 2025-07-20 PROCEDURE — 2500000001 HC RX 250 WO HCPCS SELF ADMINISTERED DRUGS (ALT 637 FOR MEDICARE OP)

## 2025-07-20 PROCEDURE — 96366 THER/PROPH/DIAG IV INF ADDON: CPT | Mod: 59

## 2025-07-20 PROCEDURE — 96375 TX/PRO/DX INJ NEW DRUG ADDON: CPT

## 2025-07-20 PROCEDURE — 83735 ASSAY OF MAGNESIUM: CPT

## 2025-07-20 PROCEDURE — G0378 HOSPITAL OBSERVATION PER HR: HCPCS

## 2025-07-20 PROCEDURE — 96361 HYDRATE IV INFUSION ADD-ON: CPT

## 2025-07-20 PROCEDURE — 2500000002 HC RX 250 W HCPCS SELF ADMINISTERED DRUGS (ALT 637 FOR MEDICARE OP, ALT 636 FOR OP/ED): Performed by: INTERNAL MEDICINE

## 2025-07-20 PROCEDURE — 2500000004 HC RX 250 GENERAL PHARMACY W/ HCPCS (ALT 636 FOR OP/ED)

## 2025-07-20 PROCEDURE — 84100 ASSAY OF PHOSPHORUS: CPT

## 2025-07-20 PROCEDURE — 84075 ASSAY ALKALINE PHOSPHATASE: CPT

## 2025-07-20 PROCEDURE — 80048 BASIC METABOLIC PNL TOTAL CA: CPT | Mod: CCI | Performed by: INTERNAL MEDICINE

## 2025-07-20 PROCEDURE — 80069 RENAL FUNCTION PANEL: CPT

## 2025-07-20 PROCEDURE — 99239 HOSP IP/OBS DSCHRG MGMT >30: CPT | Performed by: INTERNAL MEDICINE

## 2025-07-20 PROCEDURE — 2500000001 HC RX 250 WO HCPCS SELF ADMINISTERED DRUGS (ALT 637 FOR MEDICARE OP): Performed by: INTERNAL MEDICINE

## 2025-07-20 PROCEDURE — 36415 COLL VENOUS BLD VENIPUNCTURE: CPT | Performed by: INTERNAL MEDICINE

## 2025-07-20 PROCEDURE — 36415 COLL VENOUS BLD VENIPUNCTURE: CPT

## 2025-07-20 PROCEDURE — 85025 COMPLETE CBC W/AUTO DIFF WBC: CPT

## 2025-07-20 RX ORDER — SODIUM CHLORIDE, SODIUM LACTATE, POTASSIUM CHLORIDE, CALCIUM CHLORIDE 600; 310; 30; 20 MG/100ML; MG/100ML; MG/100ML; MG/100ML
125 INJECTION, SOLUTION INTRAVENOUS CONTINUOUS
Status: DISCONTINUED | OUTPATIENT
Start: 2025-07-20 | End: 2025-07-20

## 2025-07-20 RX ORDER — PANTOPRAZOLE SODIUM 40 MG/1
40 TABLET, DELAYED RELEASE ORAL
Status: DISCONTINUED | OUTPATIENT
Start: 2025-07-20 | End: 2025-07-20 | Stop reason: HOSPADM

## 2025-07-20 RX ORDER — FOLIC ACID 1 MG/1
1 TABLET ORAL DAILY
Qty: 30 TABLET | Refills: 2 | Status: SHIPPED | OUTPATIENT
Start: 2025-07-21

## 2025-07-20 RX ORDER — LANOLIN ALCOHOL/MO/W.PET/CERES
100 CREAM (GRAM) TOPICAL DAILY
Qty: 30 TABLET | Refills: 2 | Status: SHIPPED | OUTPATIENT
Start: 2025-07-23

## 2025-07-20 RX ORDER — THIAMINE HYDROCHLORIDE 100 MG/ML
100 INJECTION, SOLUTION INTRAMUSCULAR; INTRAVENOUS DAILY
Status: CANCELLED | OUTPATIENT
Start: 2025-07-20 | End: 2025-07-23

## 2025-07-20 RX ORDER — POTASSIUM CHLORIDE 20 MEQ/1
40 TABLET, EXTENDED RELEASE ORAL ONCE
Status: COMPLETED | OUTPATIENT
Start: 2025-07-20 | End: 2025-07-20

## 2025-07-20 RX ORDER — MULTIVIT-MIN/IRON FUM/FOLIC AC 7.5 MG-4
1 TABLET ORAL DAILY
Qty: 30 TABLET | Refills: 2 | Status: SHIPPED | OUTPATIENT
Start: 2025-07-21

## 2025-07-20 RX ORDER — HYDROXYZINE HYDROCHLORIDE 25 MG/1
25 TABLET, FILM COATED ORAL EVERY 6 HOURS PRN
Qty: 20 TABLET | Refills: 0 | Status: SHIPPED | OUTPATIENT
Start: 2025-07-20

## 2025-07-20 RX ORDER — POTASSIUM CHLORIDE 20 MEQ/1
40 TABLET, EXTENDED RELEASE ORAL 2 TIMES DAILY
Status: DISCONTINUED | OUTPATIENT
Start: 2025-07-20 | End: 2025-07-20

## 2025-07-20 RX ORDER — DIAZEPAM 5 MG/1
10 TABLET ORAL EVERY 2 HOUR PRN
Status: DISCONTINUED | OUTPATIENT
Start: 2025-07-20 | End: 2025-07-20 | Stop reason: HOSPADM

## 2025-07-20 RX ORDER — PANTOPRAZOLE SODIUM 40 MG/1
40 TABLET, DELAYED RELEASE ORAL
Qty: 14 TABLET | Refills: 0 | Status: SHIPPED | OUTPATIENT
Start: 2025-07-20 | End: 2025-08-03

## 2025-07-20 RX ORDER — FOLIC ACID 1 MG/1
1 TABLET ORAL DAILY
Status: CANCELLED | OUTPATIENT
Start: 2025-07-20

## 2025-07-20 RX ORDER — LANOLIN ALCOHOL/MO/W.PET/CERES
100 CREAM (GRAM) TOPICAL DAILY
Status: DISCONTINUED | OUTPATIENT
Start: 2025-07-23 | End: 2025-07-20 | Stop reason: HOSPADM

## 2025-07-20 RX ORDER — MULTIVIT-MIN/IRON FUM/FOLIC AC 7.5 MG-4
1 TABLET ORAL DAILY
Status: CANCELLED | OUTPATIENT
Start: 2025-07-20

## 2025-07-20 RX ORDER — DIAZEPAM 5 MG/1
10 TABLET ORAL EVERY 2 HOUR PRN
Status: CANCELLED | OUTPATIENT
Start: 2025-07-20

## 2025-07-20 RX ORDER — LANOLIN ALCOHOL/MO/W.PET/CERES
100 CREAM (GRAM) TOPICAL DAILY
Status: CANCELLED | OUTPATIENT
Start: 2025-07-23

## 2025-07-20 RX ORDER — ONDANSETRON 4 MG/1
4 TABLET, ORALLY DISINTEGRATING ORAL EVERY 8 HOURS PRN
Qty: 15 TABLET | Refills: 0 | Status: SHIPPED | OUTPATIENT
Start: 2025-07-20

## 2025-07-20 RX ORDER — FOLIC ACID 1 MG/1
1 TABLET ORAL DAILY
Status: DISCONTINUED | OUTPATIENT
Start: 2025-07-20 | End: 2025-07-20 | Stop reason: HOSPADM

## 2025-07-20 RX ORDER — AMLODIPINE BESYLATE 10 MG/1
10 TABLET ORAL DAILY
COMMUNITY

## 2025-07-20 RX ORDER — THIAMINE HYDROCHLORIDE 100 MG/ML
100 INJECTION, SOLUTION INTRAMUSCULAR; INTRAVENOUS DAILY
Status: DISCONTINUED | OUTPATIENT
Start: 2025-07-20 | End: 2025-07-20 | Stop reason: HOSPADM

## 2025-07-20 RX ORDER — MULTIVIT-MIN/IRON FUM/FOLIC AC 7.5 MG-4
1 TABLET ORAL DAILY
Status: DISCONTINUED | OUTPATIENT
Start: 2025-07-20 | End: 2025-07-20 | Stop reason: HOSPADM

## 2025-07-20 RX ADMIN — OXYCODONE HYDROCHLORIDE 5 MG: 5 TABLET ORAL at 10:23

## 2025-07-20 RX ADMIN — FAMOTIDINE 20 MG: 20 TABLET, FILM COATED ORAL at 04:58

## 2025-07-20 RX ADMIN — ACETAMINOPHEN 975 MG: 325 TABLET ORAL at 10:23

## 2025-07-20 RX ADMIN — PANTOPRAZOLE SODIUM 40 MG: 40 TABLET, DELAYED RELEASE ORAL at 17:07

## 2025-07-20 RX ADMIN — THIAMINE HYDROCHLORIDE 100 MG: 100 INJECTION, SOLUTION INTRAMUSCULAR; INTRAVENOUS at 10:24

## 2025-07-20 RX ADMIN — POTASSIUM CHLORIDE 20 MEQ: 14.9 INJECTION, SOLUTION INTRAVENOUS at 00:11

## 2025-07-20 RX ADMIN — Medication 1 TABLET: at 10:24

## 2025-07-20 RX ADMIN — POTASSIUM CHLORIDE 40 MEQ: 1500 TABLET, EXTENDED RELEASE ORAL at 17:07

## 2025-07-20 RX ADMIN — AMLODIPINE BESYLATE 10 MG: 10 TABLET ORAL at 10:23

## 2025-07-20 RX ADMIN — FOLIC ACID 1 MG: 1 TABLET ORAL at 10:23

## 2025-07-20 RX ADMIN — POTASSIUM CHLORIDE 40 MEQ: 1500 TABLET, EXTENDED RELEASE ORAL at 10:23

## 2025-07-20 RX ADMIN — SODIUM CHLORIDE, SODIUM LACTATE, POTASSIUM CHLORIDE, AND CALCIUM CHLORIDE 1000 ML: 600; 310; 30; 20 INJECTION, SOLUTION INTRAVENOUS at 00:10

## 2025-07-20 SDOH — SOCIAL STABILITY: SOCIAL INSECURITY: ABUSE: ADULT

## 2025-07-20 SDOH — SOCIAL STABILITY: SOCIAL INSECURITY: DOES ANYONE TRY TO KEEP YOU FROM HAVING/CONTACTING OTHER FRIENDS OR DOING THINGS OUTSIDE YOUR HOME?: NO

## 2025-07-20 SDOH — ECONOMIC STABILITY: FOOD INSECURITY: WITHIN THE PAST 12 MONTHS, YOU WORRIED THAT YOUR FOOD WOULD RUN OUT BEFORE YOU GOT THE MONEY TO BUY MORE.: NEVER TRUE

## 2025-07-20 SDOH — SOCIAL STABILITY: SOCIAL INSECURITY: WERE YOU ABLE TO COMPLETE ALL THE BEHAVIORAL HEALTH SCREENINGS?: YES

## 2025-07-20 SDOH — ECONOMIC STABILITY: FOOD INSECURITY: WITHIN THE PAST 12 MONTHS, THE FOOD YOU BOUGHT JUST DIDN'T LAST AND YOU DIDN'T HAVE MONEY TO GET MORE.: NEVER TRUE

## 2025-07-20 SDOH — SOCIAL STABILITY: SOCIAL INSECURITY: WITHIN THE LAST YEAR, HAVE YOU BEEN HUMILIATED OR EMOTIONALLY ABUSED IN OTHER WAYS BY YOUR PARTNER OR EX-PARTNER?: NO

## 2025-07-20 SDOH — ECONOMIC STABILITY: HOUSING INSECURITY: AT ANY TIME IN THE PAST 12 MONTHS, WERE YOU HOMELESS OR LIVING IN A SHELTER (INCLUDING NOW)?: NO

## 2025-07-20 SDOH — SOCIAL STABILITY: SOCIAL INSECURITY: WITHIN THE LAST YEAR, HAVE YOU BEEN AFRAID OF YOUR PARTNER OR EX-PARTNER?: NO

## 2025-07-20 SDOH — ECONOMIC STABILITY: HOUSING INSECURITY: IN THE LAST 12 MONTHS, WAS THERE A TIME WHEN YOU WERE NOT ABLE TO PAY THE MORTGAGE OR RENT ON TIME?: NO

## 2025-07-20 SDOH — ECONOMIC STABILITY: FOOD INSECURITY: HOW HARD IS IT FOR YOU TO PAY FOR THE VERY BASICS LIKE FOOD, HOUSING, MEDICAL CARE, AND HEATING?: NOT HARD AT ALL

## 2025-07-20 SDOH — ECONOMIC STABILITY: HOUSING INSECURITY: IN THE PAST 12 MONTHS, HOW MANY TIMES HAVE YOU MOVED WHERE YOU WERE LIVING?: 0

## 2025-07-20 SDOH — SOCIAL STABILITY: SOCIAL INSECURITY: DO YOU FEEL UNSAFE GOING BACK TO THE PLACE WHERE YOU ARE LIVING?: NO

## 2025-07-20 SDOH — SOCIAL STABILITY: SOCIAL INSECURITY
WITHIN THE LAST YEAR, HAVE YOU BEEN RAPED OR FORCED TO HAVE ANY KIND OF SEXUAL ACTIVITY BY YOUR PARTNER OR EX-PARTNER?: NO

## 2025-07-20 SDOH — ECONOMIC STABILITY: TRANSPORTATION INSECURITY: IN THE PAST 12 MONTHS, HAS LACK OF TRANSPORTATION KEPT YOU FROM MEDICAL APPOINTMENTS OR FROM GETTING MEDICATIONS?: NO

## 2025-07-20 SDOH — ECONOMIC STABILITY: INCOME INSECURITY: IN THE PAST 12 MONTHS HAS THE ELECTRIC, GAS, OIL, OR WATER COMPANY THREATENED TO SHUT OFF SERVICES IN YOUR HOME?: NO

## 2025-07-20 SDOH — SOCIAL STABILITY: SOCIAL INSECURITY
WITHIN THE LAST YEAR, HAVE YOU BEEN KICKED, HIT, SLAPPED, OR OTHERWISE PHYSICALLY HURT BY YOUR PARTNER OR EX-PARTNER?: NO

## 2025-07-20 SDOH — SOCIAL STABILITY: SOCIAL INSECURITY: HAVE YOU HAD ANY THOUGHTS OF HARMING ANYONE ELSE?: NO

## 2025-07-20 SDOH — SOCIAL STABILITY: SOCIAL INSECURITY: ARE THERE ANY APPARENT SIGNS OF INJURIES/BEHAVIORS THAT COULD BE RELATED TO ABUSE/NEGLECT?: NO

## 2025-07-20 SDOH — SOCIAL STABILITY: SOCIAL INSECURITY: DO YOU FEEL ANYONE HAS EXPLOITED OR TAKEN ADVANTAGE OF YOU FINANCIALLY OR OF YOUR PERSONAL PROPERTY?: NO

## 2025-07-20 SDOH — SOCIAL STABILITY: SOCIAL INSECURITY: HAVE YOU HAD THOUGHTS OF HARMING ANYONE ELSE?: NO

## 2025-07-20 SDOH — SOCIAL STABILITY: SOCIAL INSECURITY: HAS ANYONE EVER THREATENED TO HURT YOUR FAMILY OR YOUR PETS?: NO

## 2025-07-20 SDOH — SOCIAL STABILITY: SOCIAL INSECURITY: ARE YOU OR HAVE YOU BEEN THREATENED OR ABUSED PHYSICALLY, EMOTIONALLY, OR SEXUALLY BY ANYONE?: NO

## 2025-07-20 ASSESSMENT — COGNITIVE AND FUNCTIONAL STATUS - GENERAL
PATIENT BASELINE BEDBOUND: NO
DAILY ACTIVITIY SCORE: 24
MOBILITY SCORE: 24

## 2025-07-20 ASSESSMENT — LIFESTYLE VARIABLES
AUDITORY DISTURBANCES: NOT PRESENT
ANXIETY: NO ANXIETY, AT EASE
TOTAL SCORE: 0
VISUAL DISTURBANCES: NOT PRESENT
HEADACHE, FULLNESS IN HEAD: NOT PRESENT
AGITATION: NORMAL ACTIVITY
AGITATION: NORMAL ACTIVITY
AGITATION: SOMEWHAT MORE THAN NORMAL ACTIVITY
ORIENTATION AND CLOUDING OF SENSORIUM: ORIENTED AND CAN DO SERIAL ADDITIONS
VISUAL DISTURBANCES: NOT PRESENT
NAUSEA AND VOMITING: NO NAUSEA AND NO VOMITING
AUDITORY DISTURBANCES: NOT PRESENT
VISUAL DISTURBANCES: NOT PRESENT
AUDITORY DISTURBANCES: NOT PRESENT
NAUSEA AND VOMITING: NO NAUSEA AND NO VOMITING
AGITATION: NORMAL ACTIVITY
NAUSEA AND VOMITING: NO NAUSEA AND NO VOMITING
TREMOR: NO TREMOR
HEADACHE, FULLNESS IN HEAD: NOT PRESENT
ANXIETY: NO ANXIETY, AT EASE
HOW OFTEN DO YOU HAVE 6 OR MORE DRINKS ON ONE OCCASION: WEEKLY
TREMOR: NO TREMOR
ORIENTATION AND CLOUDING OF SENSORIUM: ORIENTED AND CAN DO SERIAL ADDITIONS
PAROXYSMAL SWEATS: NO SWEAT VISIBLE
AGITATION: NORMAL ACTIVITY
HEADACHE, FULLNESS IN HEAD: NOT PRESENT
VISUAL DISTURBANCES: NOT PRESENT
AUDIT-C TOTAL SCORE: 7
AUDITORY DISTURBANCES: NOT PRESENT
ORIENTATION AND CLOUDING OF SENSORIUM: ORIENTED AND CAN DO SERIAL ADDITIONS
AUDITORY DISTURBANCES: NOT PRESENT
AGITATION: NORMAL ACTIVITY
HEADACHE, FULLNESS IN HEAD: NOT PRESENT
PAROXYSMAL SWEATS: NO SWEAT VISIBLE
TREMOR: NO TREMOR
TREMOR: NO TREMOR
PAROXYSMAL SWEATS: NO SWEAT VISIBLE
HOW OFTEN DO YOU HAVE A DRINK CONTAINING ALCOHOL: 2-3 TIMES A WEEK
PAROXYSMAL SWEATS: NO SWEAT VISIBLE
TOTAL SCORE: 2
AUDITORY DISTURBANCES: NOT PRESENT
PAROXYSMAL SWEATS: NO SWEAT VISIBLE
ORIENTATION AND CLOUDING OF SENSORIUM: ORIENTED AND CAN DO SERIAL ADDITIONS
HEADACHE, FULLNESS IN HEAD: NOT PRESENT
VISUAL DISTURBANCES: NOT PRESENT
AGITATION: NORMAL ACTIVITY
ANXIETY: NO ANXIETY, AT EASE
VISUAL DISTURBANCES: NOT PRESENT
TREMOR: NO TREMOR
PAROXYSMAL SWEATS: NO SWEAT VISIBLE
NAUSEA AND VOMITING: NO NAUSEA AND NO VOMITING
HEADACHE, FULLNESS IN HEAD: NOT PRESENT
ORIENTATION AND CLOUDING OF SENSORIUM: ORIENTED AND CAN DO SERIAL ADDITIONS
SKIP TO QUESTIONS 9-10: 0
TOTAL SCORE: 0
TOTAL SCORE: 0
TREMOR: NO TREMOR
NAUSEA AND VOMITING: NO NAUSEA AND NO VOMITING
TOTAL SCORE: 0
TOTAL SCORE: 0
ANXIETY: MILDLY ANXIOUS
NAUSEA AND VOMITING: NO NAUSEA AND NO VOMITING
ANXIETY: NO ANXIETY, AT EASE
ORIENTATION AND CLOUDING OF SENSORIUM: ORIENTED AND CAN DO SERIAL ADDITIONS
HEADACHE, FULLNESS IN HEAD: NOT PRESENT
ANXIETY: NO ANXIETY, AT EASE
PAROXYSMAL SWEATS: NO SWEAT VISIBLE
HOW MANY STANDARD DRINKS CONTAINING ALCOHOL DO YOU HAVE ON A TYPICAL DAY: 3 OR 4
ANXIETY: NO ANXIETY, AT EASE
AUDIT-C TOTAL SCORE: 7
TREMOR: NO TREMOR
AUDITORY DISTURBANCES: NOT PRESENT
NAUSEA AND VOMITING: NO NAUSEA AND NO VOMITING
VISUAL DISTURBANCES: NOT PRESENT

## 2025-07-20 ASSESSMENT — ACTIVITIES OF DAILY LIVING (ADL)
BATHING: INDEPENDENT
JUDGMENT_ADEQUATE_SAFELY_COMPLETE_DAILY_ACTIVITIES: YES
WALKS IN HOME: INDEPENDENT
TOILETING: INDEPENDENT
PATIENT'S MEMORY ADEQUATE TO SAFELY COMPLETE DAILY ACTIVITIES?: YES
GROOMING: INDEPENDENT
DRESSING YOURSELF: INDEPENDENT
HEARING - LEFT EAR: FUNCTIONAL
ADEQUATE_TO_COMPLETE_ADL: NO
LACK_OF_TRANSPORTATION: NO
FEEDING YOURSELF: INDEPENDENT
HEARING - RIGHT EAR: FUNCTIONAL
LACK_OF_TRANSPORTATION: NO

## 2025-07-20 ASSESSMENT — PATIENT HEALTH QUESTIONNAIRE - PHQ9
2. FEELING DOWN, DEPRESSED OR HOPELESS: NOT AT ALL
1. LITTLE INTEREST OR PLEASURE IN DOING THINGS: NOT AT ALL
SUM OF ALL RESPONSES TO PHQ9 QUESTIONS 1 & 2: 0

## 2025-07-20 ASSESSMENT — PAIN SCALES - GENERAL
PAINLEVEL_OUTOF10: 0 - NO PAIN
PAINLEVEL_OUTOF10: 6

## 2025-07-20 ASSESSMENT — PAIN - FUNCTIONAL ASSESSMENT
PAIN_FUNCTIONAL_ASSESSMENT: 0-10
PAIN_FUNCTIONAL_ASSESSMENT: 0-10

## 2025-07-20 NOTE — NURSING NOTE
Patient being discharged to home in stable condition. Patient verbalizes understanding of AVS. Patient is in stable condition and shows no signs or symptoms of distress. Patient leaving the unit independently.

## 2025-07-20 NOTE — CARE PLAN
The patient's goals for the shift include feel better    The clinical goals for the shift include pt will be free from falls or injury    Over the shift, the patient did make progress toward the goals.   Problem: Pain - Adult  Goal: Verbalizes/displays adequate comfort level or baseline comfort level  Outcome: Progressing     Problem: Safety - Adult  Goal: Free from fall injury  Outcome: Progressing     Problem: Chronic Conditions and Co-morbidities  Goal: Patient's chronic conditions and co-morbidity symptoms are monitored and maintained or improved  Outcome: Progressing     Problem: Nutrition  Goal: Nutrient intake appropriate for maintaining nutritional needs  Outcome: Progressing

## 2025-07-20 NOTE — DISCHARGE SUMMARY
Discharge Diagnosis  Acute on chronic pancreatitis (Multi)  Hypokalemia, Hypomagnesemia.     Issues Requiring Follow-Up  GI follow up for Acute on Chronic Pancreatitis.     Discharge Meds     Medication List      START taking these medications     folic acid 1 mg tablet; Commonly known as: Folvite; Take 1 tablet (1 mg)   by mouth once daily.; Start taking on: July 21, 2025   multivitamin with minerals tablet; Take 1 tablet by mouth once daily.;   Start taking on: July 21, 2025   ondansetron ODT 4 mg disintegrating tablet; Commonly known as:   Zofran-ODT; Dissolve 1 tablet (4 mg) in the mouth every 8 hours if needed   for nausea or vomiting.   pantoprazole 40 mg EC tablet; Commonly known as: ProtoNix; Take 1 tablet   (40 mg) by mouth once daily in the morning. Take before meals for 14 days.   Do not crush, chew, or split.   thiamine 100 mg tablet; Commonly known as: Vitamin B-1; Take 1 tablet   (100 mg) by mouth once daily. Do not fill before July 23, 2025.; Start   taking on: July 23, 2025     CONTINUE taking these medications     amLODIPine 10 mg tablet; Commonly known as: Norvasc   dicyclomine 20 mg tablet; Commonly known as: Bentyl; Take 0.5 tablets   (10 mg) by mouth 4 times a day before meals.       Test Results Pending At Discharge  Pending Labs       No current pending labs.            Hospital Course    Venkat Rod is a 60 y.o. male with a PMH significant for HTN, chronic pancreatitis, alcohol use disorder and ventral hernia s/p repair x 2 who is presented to [facility] ED with c/o epigastric abdominal pain. He was diagnosed with acute on chronic pancreatitis with noted peripancreatic fluid collection on CT and electrolyte derangements (hypoK/hypoMag). He was monitored over night and electrolytes were replaced. He reported improvement in pain. He tolerated food normally on 7/20.     Issues addressed during the stay:      #Acute/Chronic Pancreatitis ( alcohol related)  Elevated lipase, CT confirmed (normal  lactate)  Rx with Morphine for pain in ED 10 mg total (2>4>4)  Reported improvement in pain and keen to eat,   Tolerating diet,   Advised abstinence from Alcohol,   He reports importance of alcohol abstinence and understands acute pancreatitis is a serious condition.      Hypokalemia/ Hypomagnesemia:  Magnesium improved from 1.5 to 1.89  Potassium improved from 2.6 to 3.5   Supplemented with K 40 meq, ( after the level at 3.5)  Encouraged eat banana and oranges.      #HTN  -c/w home amlodipine 10 mg daily.      #ETOH use disorder  ::in remission since pancreatitis diagnosis (2016), endorses ongong ETOH use. Last drink 7/19  No signs of with drawl,   Continue Thiamine, folate and Multi vitamin supplements,     Patient was comfortable and was keen to go home.   Discharged home in a stable condition. Referral placed to GI and PCP appointments. Advised to recheck labs in 1 to 2 weeks with PCP.   Discharge day management time > 30 minutes.          Pertinent Physical Exam At Time of Discharge:  Vitals:    07/20/25 1534   BP: 114/64   Pulse: 75   Resp:    Temp: 36.6 °C (97.9 °F)   SpO2: 98%       Physical Exam  Constitutional:       Appearance: Normal appearance.      Comments: Comfortable at rest on RA    HENT:      Head: Normocephalic.      Nose: Nose normal.      Mouth/Throat:      Mouth: Mucous membranes are moist.     Eyes:      Extraocular Movements: Extraocular movements intact.      Pupils: Pupils are equal, round, and reactive to light.       Cardiovascular:      Rate and Rhythm: Normal rate and regular rhythm.      Heart sounds: Normal heart sounds. No murmur heard.  Pulmonary:      Effort: Pulmonary effort is normal. No respiratory distress.      Breath sounds: Normal breath sounds. No wheezing.   Abdominal:      General: There is no distension.      Palpations: Abdomen is soft.      Tenderness: There is no abdominal tenderness. There is no guarding.     Musculoskeletal:         General: Normal range of motion.       Cervical back: Normal range of motion.     Skin:     General: Skin is warm.     Neurological:      General: No focal deficit present.      Mental Status: He is alert and oriented to person, place, and time. Mental status is at baseline.     Psychiatric:         Mood and Affect: Mood normal.         Outpatient Follow-Up  No future appointments.      Castillo Nixon MD

## 2025-07-20 NOTE — PROGRESS NOTES
Venkat Rod is a 60 y.o. male on day 1 of admission presenting with Acute on chronic pancreatitis (Multi).      Subjective   No events over night,   Reports no abdominal pain, No nausea,   He is keen to try food,     Objective     Last Recorded Vitals  /72 (BP Location: Right arm, Patient Position: Lying)   Pulse 87   Temp 36.9 °C (98.4 °F) (Temporal)   Resp 17   Wt 93.4 kg (206 lb)   SpO2 95%   Intake/Output last 3 Shifts:  No intake or output data in the 24 hours ending 07/20/25 1358    Admission Weight  Weight: 93.4 kg (206 lb) (07/19/25 1909)    Daily Weight  07/19/25 : 93.4 kg (206 lb)    Image Results  CT abdomen pelvis w IV contrast  Narrative: Interpreted By:  Medina Drummond and Omar Mahmoud   STUDY:  CT ABDOMEN PELVIS W IV CONTRAST;  7/19/2025 9:57 pm      INDICATION:  Signs/Symptoms:abd pain , hx of hernia repair. eval for intraabd  infection, obstruction, appendecitis, pancratitis, hepatobili path.          COMPARISON:  None.      ACCESSION NUMBER(S):  [id number]      ORDERING CLINICIAN:  BONY WOLF      TECHNIQUE:  CT of the abdomen and pelvis was performed.  Standard contiguous  axial images were obtained at 3 mm slice thickness through the  abdomen and pelvis. Coronal and sagittal reconstructions at 3 mm  slice thickness were performed.  80 ML of Omnipaque 350 was  administered intravenously without immediate complication.      FINDINGS:  LOWER CHEST:  Mild right basilar bronchiectasis.      ABDOMEN:      LIVER:  Mild hepatomegaly with the liver measuring up to 18.8 cm in  craniocaudal dimension. No evidence steatosis. No focal hepatic  lesions.      BILE DUCTS:  Normal caliber.      GALLBLADDER:  No calcified stones. No wall thickening.      PANCREAS:  The pancreas appears edematous with peripancreatic fat stranding  within the pancreatic head, uncinate process, and proximal tail. The  pancreatic parenchyma enhances homogeneously. Pancreatic  calcifications within the head and  uncinate process. There are areas  with a prominent main pancreatic duct measuring up to 4 mm in maximum  diameter (series 203, image 42). These findings are concerning for  acute on chronic interstitial pancreatitis with ill-defined  peripancreatic fluid. The peripancreatic vasculature is patent.      SPLEEN:  The spleen is normal in size. A small accessory splenule is present.      ADRENAL GLANDS:  Bilateral adrenal glands appear normal.      KIDNEYS AND URETERS:  The kidneys are normal in size and enhance symmetrically.  No  hydroureteronephrosis or nephroureterolithiasis is identified.  Indeterminate exophytic lesion measuring 1.1 cm within the superior  pole of the right kidney (series 201, image 44). There is a simple  renal cysts measuring 1.3 cm within the interpolar right kidney.      PELVIS:      BLADDER:  The urinary bladder appears normal without abnormal wall thickening.      REPRODUCTIVE ORGANS:  The prostate is not enlarged.      BOWEL:  Small hiatal hernia. Stomach is underdistended with apparent wall  thickening. Peripancreatic inflammatory changes extend to the  stomach, duodenal and proximal jejunal loops, likely reactive  visualized loops of bowel are without evidence for obstruction. The  appendix appears normal. Mural stratification of the ascending colon  likely sequela of remote colitis.      VESSELS:  Calcific atherosclerosis of the aortoiliac vessels. There is no  aneurysmal dilatation of the abdominal aorta. The IVC appears normal.      PERITONEUM/RETROPERITONEUM/LYMPH NODES:  There is ill-defined peripancreatic fluid extending to the root of  the mesentery. No free air. Prominent peripancreatic lymph nodes  noted. No abdominopelvic lymphadenopathy is present.      BONES AND ABDOMINAL WALL:  Multilevel degenerative changes of the spine. The abdominal wall soft  tissues appear normal.      Impression: 1. Findings consistent with acute on chronic interstitial  pancreatitis with ill-defined  peripancreatic fluid. Correlate with  laboratory examination.  2. Inflammatory changes and ill-defined fluid extends to the stomach  and proximal small bowel loops, likely reactive.  3. Additional findings as noted above.      I personally reviewed the images/study and I agree with the findings  as stated by Neetu Spencer MD (PGY-3). This study was interpreted at  [facility], [place], [place].      MACRO:  None      Signed by: Medina Drummond 7/19/2025 10:34 PM  Dictation workstation:   [id number]    Vitals:    07/20/25 1146   BP: 126/72   Pulse: 87   Resp: 17   Temp: 36.9 °C (98.4 °F)   SpO2: 95%       Physical Exam  Constitutional:       Appearance: Normal appearance.      Comments: Comfortable on RA at rest.    HENT:      Head: Normocephalic.      Nose: Nose normal.      Mouth/Throat:      Mouth: Mucous membranes are moist.     Eyes:      Extraocular Movements: Extraocular movements intact.      Pupils: Pupils are equal, round, and reactive to light.       Cardiovascular:      Rate and Rhythm: Normal rate and regular rhythm.      Heart sounds: Normal heart sounds. No murmur heard.  Pulmonary:      Effort: Pulmonary effort is normal. No respiratory distress.      Breath sounds: Normal breath sounds. No wheezing.   Abdominal:      General: There is no distension.      Palpations: Abdomen is soft.      Tenderness: There is no abdominal tenderness. There is no guarding.     Musculoskeletal:         General: Normal range of motion.      Cervical back: Normal range of motion.     Skin:     General: Skin is warm.     Neurological:      General: No focal deficit present.      Mental Status: He is alert and oriented to person, place, and time. Mental status is at baseline.     Psychiatric:         Mood and Affect: Mood normal.         Relevant Results  Scheduled medications  Scheduled Medications[1]  Continuous medications  Continuous Medications[2]  PRN medications  PRN  Medications[3]    Results for orders placed or performed during the hospital encounter of 07/19/25 (from the past 24 hours)   CBC and Auto Differential   Result Value Ref Range    WBC 9.9 4.4 - 11.3 x10*3/uL    nRBC 0.0 0.0 - 0.0 /100 WBCs    RBC 3.71 (L) 4.50 - 5.90 x10*6/uL    Hemoglobin 12.2 (L) 13.5 - 17.5 g/dL    Hematocrit 33.5 (L) 41.0 - 52.0 %    MCV 90 80 - 100 fL    MCH 32.9 26.0 - 34.0 pg    MCHC 36.4 (H) 32.0 - 36.0 g/dL    RDW 12.1 11.5 - 14.5 %    Platelets 234 150 - 450 x10*3/uL    Neutrophils % 89.9 40.0 - 80.0 %    Immature Granulocytes %, Automated 0.3 0.0 - 0.9 %    Lymphocytes % 6.2 13.0 - 44.0 %    Monocytes % 3.3 2.0 - 10.0 %    Eosinophils % 0.0 0.0 - 6.0 %    Basophils % 0.3 0.0 - 2.0 %    Neutrophils Absolute 8.87 (H) 1.20 - 7.70 x10*3/uL    Immature Granulocytes Absolute, Automated 0.03 0.00 - 0.70 x10*3/uL    Lymphocytes Absolute 0.61 (L) 1.20 - 4.80 x10*3/uL    Monocytes Absolute 0.33 0.10 - 1.00 x10*3/uL    Eosinophils Absolute 0.00 0.00 - 0.70 x10*3/uL    Basophils Absolute 0.03 0.00 - 0.10 x10*3/uL   Lipase   Result Value Ref Range    Lipase 516 (H) 9 - 82 U/L   Comprehensive metabolic panel   Result Value Ref Range    Glucose 159 (H) 74 - 99 mg/dL    Sodium 138 136 - 145 mmol/L    Potassium 2.6 (LL) 3.5 - 5.3 mmol/L    Chloride 99 98 - 107 mmol/L    Bicarbonate 25 21 - 32 mmol/L    Anion Gap 17 10 - 20 mmol/L    Urea Nitrogen 9 6 - 23 mg/dL    Creatinine 1.21 0.50 - 1.30 mg/dL    eGFR 69 >60 mL/min/1.73m*2    Calcium 9.3 8.6 - 10.6 mg/dL    Albumin 4.4 3.4 - 5.0 g/dL    Alkaline Phosphatase 108 33 - 136 U/L    Total Protein 7.7 6.4 - 8.2 g/dL    AST 32 9 - 39 U/L    Bilirubin, Total 1.3 (H) 0.0 - 1.2 mg/dL    ALT 21 10 - 52 U/L   Blood Gas Lactic Acid, Venous   Result Value Ref Range    POCT Lactate, Venous 1.2 0.4 - 2.0 mmol/L   Magnesium   Result Value Ref Range    Magnesium 1.54 (L) 1.60 - 2.40 mg/dL   Phosphorus   Result Value Ref Range    Phosphorus 3.4 2.5 - 4.9 mg/dL    Bilirubin, Direct   Result Value Ref Range    Bilirubin, Direct 0.2 0.0 - 0.3 mg/dL   Hepatic function panel   Result Value Ref Range    Albumin 3.8 3.4 - 5.0 g/dL    Bilirubin, Total 1.4 (H) 0.0 - 1.2 mg/dL    Bilirubin, Direct 0.3 0.0 - 0.3 mg/dL    Alkaline Phosphatase 98 33 - 136 U/L    ALT 14 10 - 52 U/L    AST 26 9 - 39 U/L    Total Protein 6.7 6.4 - 8.2 g/dL   Magnesium   Result Value Ref Range    Magnesium 1.89 1.60 - 2.40 mg/dL   CBC and Auto Differential   Result Value Ref Range    WBC 7.6 4.4 - 11.3 x10*3/uL    nRBC 0.0 0.0 - 0.0 /100 WBCs    RBC 3.65 (L) 4.50 - 5.90 x10*6/uL    Hemoglobin 12.0 (L) 13.5 - 17.5 g/dL    Hematocrit 35.0 (L) 41.0 - 52.0 %    MCV 96 80 - 100 fL    MCH 32.9 26.0 - 34.0 pg    MCHC 34.3 32.0 - 36.0 g/dL    RDW 12.2 11.5 - 14.5 %    Platelets 233 150 - 450 x10*3/uL    Neutrophils % 77.1 40.0 - 80.0 %    Immature Granulocytes %, Automated 0.3 0.0 - 0.9 %    Lymphocytes % 16.9 13.0 - 44.0 %    Monocytes % 4.8 2.0 - 10.0 %    Eosinophils % 0.5 0.0 - 6.0 %    Basophils % 0.4 0.0 - 2.0 %    Neutrophils Absolute 5.89 1.20 - 7.70 x10*3/uL    Immature Granulocytes Absolute, Automated 0.02 0.00 - 0.70 x10*3/uL    Lymphocytes Absolute 1.29 1.20 - 4.80 x10*3/uL    Monocytes Absolute 0.37 0.10 - 1.00 x10*3/uL    Eosinophils Absolute 0.04 0.00 - 0.70 x10*3/uL    Basophils Absolute 0.03 0.00 - 0.10 x10*3/uL   Phosphorus   Result Value Ref Range    Phosphorus 2.8 2.5 - 4.9 mg/dL   Basic Metabolic Panel   Result Value Ref Range    Glucose 116 (H) 74 - 99 mg/dL    Sodium 139 136 - 145 mmol/L    Potassium 2.9 (LL) 3.5 - 5.3 mmol/L    Chloride 102 98 - 107 mmol/L    Bicarbonate 28 21 - 32 mmol/L    Anion Gap 12 10 - 20 mmol/L    Urea Nitrogen 6 6 - 23 mg/dL    Creatinine 1.03 0.50 - 1.30 mg/dL    eGFR 83 >60 mL/min/1.73m*2    Calcium 9.0 8.6 - 10.6 mg/dL     Assessment & Plan  Acute on chronic pancreatitis (Multi)  Venkat Rod is a 60 y.o. male with a PMH significant for HTN, chronic pancreatitis,  alcohol use disorder and ventral hernia s/p repair x 2 who is presented to [facility] ED with c/o epigastric abdominal pain. He is diagnosed with acute on chronic pancreatitis with noted peripancreatic fluid collection on CT and electrolyte derangements (hypoK/hypoMag). Reports improvement in pain.      #Acute/Chronic Pancreatitis   Elevated lipase, CT confirmed (normal lactate)  Rx with Morphine for pain in ED 10 mg total (2>4>4)  Reports improvement in pain and keen to eat,   -Schedule Tylenol 975 Q8  -Oxy 5 q6,   -Zofran q6h PRN, w/ Compazine 2nd line    Hypokalemia/ Hypomagnesemia:  Magnesium improved from 1.5 to 1.89  Potassium improved from 2.6 to 2.9   Supplement levels  Monitor,      #HTN  -c/w home amlodipine     #ETOH use disorder  ::in remission since pancreatitis diagnosis (2016), endorses ongong ETOH use. Last drink 7/19  Monitor on CIWA  Vitamin supplements,         F: Replete PRN  E: Replete PRN  N: Low Fat, Low Carb Diet  DVT Ppx: S/c Lovenox   GI Ppx: PPI.      Pain regimen: Tylenol / Oxy / Dilaudid  GI Laxative: Miralax PRN     Code status: Full Code  Emergency contact: Mamta Victor (Friend) [phone]      Dispo: pending stable electrolytes and improvement in pain.      ADOD: 1 to 2 days.       Castillo Nixon MD           [1] acetaminophen, 975 mg, oral, q8h  amLODIPine, 10 mg, oral, Daily  enoxaparin, 40 mg, subcutaneous, q24h  famotidine, 20 mg, oral, BID   Or  famotidine, 20 mg, intravenous, BID  folic acid, 1 mg, oral, Daily  multivitamin with minerals, 1 tablet, oral, Daily  potassium chloride CR, 40 mEq, oral, BID  [START ON 7/23/2025] thiamine, 100 mg, oral, Daily  thiamine, 100 mg, intravenous, Daily  [2] [Held by provider] lactated Ringer's, 150 mL/hr  [3] PRN medications: diazePAM, melatonin, ondansetron **OR** ondansetron, oxyCODONE, polyethylene glycol, prochlorperazine **OR** prochlorperazine **OR** prochlorperazine

## 2025-07-20 NOTE — ASSESSMENT & PLAN NOTE
Venkat Rod is a 60 y.o. male with a PMH significant for HTN, chronic pancreatitis, alcohol use disorder and ventral hernia s/p repair x 2 who is presented to [facility] ED with c/o epigastric abdominal pain. He is diagnosed with acute on chronic pancreatitis with noted peripancreatic fluid collection on CT and electrolyte derangements (hypoK/hypoMag). Reports improvement in pain.      #Acute/Chronic Pancreatitis   Elevated lipase, CT confirmed (normal lactate)  Rx with Morphine for pain in ED 10 mg total (2>4>4)  Reports improvement in pain and keen to eat,   -Schedule Tylenol 975 Q8  -Oxy 5 q6,   -Zofran q6h PRN, w/ Compazine 2nd line    Hypokalemia/ Hypomagnesemia:  Magnesium improved from 1.5 to 1.89  Potassium improved from 2.6 to 2.9   Supplement levels  Monitor,      #HTN  -c/w home amlodipine     #ETOH use disorder  ::in remission since pancreatitis diagnosis (2016), endorses ongong ETOH use. Last drink 7/19  Monitor on CIWA  Vitamin supplements,         F: Replete PRN  E: Replete PRN  N: Low Fat, Low Carb Diet  DVT Ppx: S/c Lovenox   GI Ppx: PPI.      Pain regimen: Tylenol / Oxy / Dilaudid  GI Laxative: Miralax PRN     Code status: Full Code  Emergency contact: Mamta Victor (Friend) [phone]      Dispo: pending stable electrolytes and improvement in pain.

## 2025-07-20 NOTE — PROGRESS NOTES
07/20/25 1342   Discharge Planning   Living Arrangements Alone   Support Systems Friends/neighbors   Assistance Needed Patient independent with ADL's   Type of Residence Private residence   Number of Stairs to Enter Residence 50   Number of Stairs Within Residence 0   Do you have animals or pets at home? No   Who is requesting discharge planning? Provider   Home or Post Acute Services None   Expected Discharge Disposition Home   Does the patient need discharge transport arranged? No   Financial Resource Strain   How hard is it for you to pay for the very basics like food, housing, medical care, and heating? Somewhat   Housing Stability   In the last 12 months, was there a time when you were not able to pay the mortgage or rent on time? N   At any time in the past 12 months, were you homeless or living in a shelter (including now)? N   Transportation Needs   In the past 12 months, has lack of transportation kept you from medical appointments or from getting medications? no   Patient Choice   Patient / Family choosing to utilize agency / facility established prior to hospitalization No     Assessment Note:  Met with patient and Introduced myself as care coordinator and member of the Care Transitions team for discharge planning. Patient demographics and contact information verified. Pt feels safe at home. Patient have no further questions or concerns at this time.    Transportation: Patient use public transportation to get to appt  Pharmacy: [facility]   DME: None  Previous home care: None  Falls: no recent falls  PCP:  [facility]  Dialysis: Denies  Diabetic: Denies    TCC will continue to follow and update the plan as warranted.    ORACIO DoshiN-RN  Transitional Care Coordinator (TCC)  [phone] ext [phone]

## 2025-07-20 NOTE — H&P
Subjective     Chief concern:   Chief Complaint   Patient presents with    Abdominal Pain       History of present illness:  Venkat Rod is a 60 y.o. male with PMHX notable for Hypertension, chronic pancreatitis, alcohol abuse, and ventral hernia s/p repair x2 who presents to ED with approximately 12 hours of severe abdominal pain.    Of note he has a known history of pancreatitis and his most recent admission for it in December 2024 at that time drinking liquor immediately prior to the event.    ED workup notable for hypokalemia 2.6, Hypomagnesemia 1.54, Lipase of 516, and CT A/P w/ contrast revealing acute/chronic pancreatitis with associated peripancreatic fluid collection. He has started fluid resuscitation (1L LR); and he has received 2g magnesium IV and ordered to receive 60mEq of potassium IV by ED.     On interview he reports He reports that his pain started around 12 noon rating it at that time a 10/10. He denies eating anything today but on further questioning, reports that he was having a get together with friends and alcohol. His last drink was canned cocktail minutes before the pain started and it continued to worsen until it became untolerable in addition to retching without actual emesis. He reports that he still drinks daily with more drinks during social gatherings 1-3 times per week (at this point having ~3 drinks). He reports this is a drastic improvement of his liquor consumption and reports that he on occasion stops drinking cold turkey and sustains at times for >1wk. Denies ever being admitted to the ICU for ETOH withdrawals before. Regarding his hypoK/hypoMg he reports excessive urination in relationship to his ETOH consumption. Presently his abdominal pain is 2/10, and his nausea has nearly resolved. Otherwise denying any dyspnea, chest pain, changes to bowel habits.    ED course:   Vital signs:  Vitals:    07/20/25 0046   BP: 156/90   Pulse: 89   Resp: 18   Temp: 37.4 °C (99.3 °F)   SpO2:  96%      SpO2 on RA    ECG: NSR, no evidence of new/acute ischemia    Labs:   Lactate 1.2, Lipase 516     CBC            12.2     9.9>---------<234              33.5     BMP  138     99    9                  ---------------------<159     2.6       25     1.21            Ca 9.3 Phos 3.4 Mg 1.54         ALT 21 AST 32 AlkPhos 108 Total bili 1.3           Imaging  CT abdomen pelvis w IV contrast  Result Date: 7/19/2025  1. Findings consistent with acute on chronic interstitial pancreatitis with ill-defined peripancreatic fluid. Correlate with laboratory examination. 2. Inflammatory changes and ill-defined fluid extends to the stomach and proximal small bowel loops, likely reactive. 3. Additional findings as noted above.   I personally reviewed the images/study and I agree with the findings as stated by Neetu Spencer MD (PGY-3). This study was interpreted at [facility], [place], [place].   MACRO: None   Signed by: Medina Drummond 7/19/2025 10:34 PM Dictation workstation:   [id number]      Micro/culture data:  No results found for the last 90 days.        ED Course/Interventions:  Medications   potassium chloride 20 mEq in sterile water for injection 100 mL (20 mEq intravenous New Bag 7/20/25 0011)   enoxaparin (Lovenox) syringe 40 mg (40 mg subcutaneous Not Given 7/19/25 3105)   polyethylene glycol (Glycolax, Miralax) packet 17 g (has no administration in time range)   ondansetron (Zofran) tablet 4 mg (has no administration in time range)     Or   ondansetron (Zofran) injection 4 mg (has no administration in time range)   prochlorperazine (Compazine) tablet 10 mg (has no administration in time range)     Or   prochlorperazine (Compazine) injection 10 mg (has no administration in time range)     Or   prochlorperazine (Compazine) suppository 25 mg (has no administration in time range)   melatonin tablet 3 mg (has no administration in time range)   acetaminophen (Tylenol) tablet 975 mg  (975 mg oral Not Given 7/20/25 0004)   oxyCODONE (Roxicodone) immediate release tablet 5 mg (has no administration in time range)   HYDROmorphone (Dilaudid) injection 0.5 mg (has no administration in time range)   lactated Ringer's bolus 1,000 mL (1,000 mL intravenous New Bag 7/20/25 0010)   lactated Ringer's infusion (has no administration in time range)   famotidine (Pepcid) tablet 20 mg (has no administration in time range)     Or   famotidine PF (Pepcid) injection 20 mg (has no administration in time range)   amLODIPine (Norvasc) tablet 10 mg (has no administration in time range)   alum-mag hydroxide-simeth (Mylanta) 200-200-20 mg/5 mL oral suspension 30 mL (30 mL oral Given 7/19/25 1640)   lidocaine (Xylocaine) 2 % mouth solution 15 mL (15 mL oral Given 7/19/25 1640)   famotidine PF (Pepcid) injection 20 mg (20 mg intravenous Given 7/19/25 1640)   ondansetron (Zofran) injection 4 mg (4 mg intravenous Given 7/19/25 1640)   acetaminophen (Tylenol) tablet 975 mg (975 mg oral Given 7/19/25 1641)   morphine injection 2 mg (2 mg intravenous Given 7/19/25 1640)   magnesium sulfate 2 g in sterile water for injection 50 mL (0 g intravenous Stopped 7/19/25 2044)   lactated Ringer's bolus 1,000 mL (0 mL intravenous Stopped 7/19/25 2004)   morphine injection 4 mg (4 mg intravenous Given 7/19/25 1844)   iohexol (OMNIPaque) 350 mg iodine/mL solution 80 mL (80 mL intravenous Given 7/19/25 2152)   morphine injection 4 mg (4 mg intravenous Given 7/19/25 2247)        Past Medical History:  He has a past medical history of Hypertension.    Past Surgical History:  He has no past surgical history on file.     Social history:  Living Situation: apartment  Alcohol: 1-3 times per week (2-3 drinks per session)  Alcohol Use: Alcohol Misuse (11/14/2019)    Received from [employer]    AUDIT-C     Q1: How often do you have a drink containing alcohol?: 2-4 times a month     Q2: How many drinks containing alcohol do you have on a typical  "day when you are drinking?: 10 or more     Frequency of Binge Drinking: Not on file     Tobacco: Never  Tobacco Use: Low Risk  (6/15/2025)    Patient History     Smoking Tobacco Use: Never     Smokeless Tobacco Use: Never     Passive Exposure: Not on file      Illicit Drugs: Denies  Social History     Substance and Sexual Activity   Drug Use Not on file     I have confirmed the above Social History with particular focus on recent Tobacco, Alcohol, and Substance use.      Family history:  Family History[1]     Allergies:  Patient has no known allergies.    Home medications:  Prior to Admission medications   Medication Sig Start Date End Date Taking? Authorizing Provider   dicyclomine (Bentyl) 20 mg tablet Take 0.5 tablets (10 mg) by mouth 4 times a day before meals. 11/29/23   Dima Adames PA-C          Objective   Vital signs:  Blood pressure 156/90, pulse 89, temperature 37.4 °C (99.3 °F), resp. rate 18, height 1.727 m (5' 8\"), weight 93.4 kg (206 lb), SpO2 96%.    Physical Exam  Constitutional:       General: He is not in acute distress.     Appearance: He is ill-appearing.   HENT:      Mouth/Throat:      Mouth: Mucous membranes are moist.     Eyes:      Extraocular Movements: Extraocular movements intact.       Cardiovascular:      Rate and Rhythm: Normal rate and regular rhythm.      Pulses: Normal pulses.      Heart sounds: Normal heart sounds.   Pulmonary:      Effort: Pulmonary effort is normal. No respiratory distress.      Breath sounds: Normal breath sounds. No wheezing, rhonchi or rales.   Abdominal:      General: Abdomen is flat. There is no distension.      Palpations: Abdomen is soft. There is no mass.      Tenderness: Tenderness: Very mild tenderness on Deep palpation. There is no guarding or rebound.      Hernia: Hernia: soft, reducible, non-tender.     Musculoskeletal:         General: No swelling.     Skin:     General: Skin is warm and dry.     Neurological:      General: No focal deficit " present.      Mental Status: He is alert and oriented to person, place, and time.            Assessment/Plan   Venkat Rod is a 60 y.o. male with PMHX notable for HTN, chronic pancreatitis, alcohol abuse, and ventral hernia s/p repair x2 who presents to ED with approximately 12 hours of severe abdominal pain. Since found to have acute on chronic pancreatitis with noted peripancreatic fluid collection and electrolyte derangements (hypoK/hypoMag). Will manage as acute pancreatitis with judicious electrolyte repletion, and as there are no current features to suggest infection (normal WBC, afebrile, not in shock, normal HR) > will defer abx at this time.    #Acute/Chronic Pancreatitis   #Hypokalemia  #Hypomagnesemia  ::Elevated lipase, CT confirmed (normal lactate)  ::Received Morphine for pain in ED 10 mg total (2>4>4)  ::Received 2g Mg in ED, actively receiving 60 mEq of K (6 hour duration)  -Schedule Tylenol 975 Q8  -Oxy 5 q6, dilaudid 0.5 q4 breakthrough  -Zofran q6h PRN, w/ Compazine 2nd line  -S/p 1L LR in ED (90kg male) will administer additional 1L over 4 hours then c/w maintenance fluids 150cc/hr  -CTM urine output  -Fu RFP/Mg for adequate repletion.  -Telemetry  -will add on Direct Bili for trend if c/f biliary obstruction rises    #HTN  -c/w home amlodipine    #ETOH use disorder  ::in remission since pancreatitis diagnosis (2016), endorses ongong ETOH use. Last drink 7/19  -CIWA  -CTM      F: Replete PRN  E: Replete PRN  N: Low Fat, Low Carb Diet  DVT Ppx: Lovenox   GI Ppx: Famotidine while in acute phase    Pain regimen: Tylenol / Oxy / Dilaudid  GI Laxative: Miralax PRN    Code status: Full Code  Emergency contact: Mamta Victor (Friend) [phone]         To be formally staffed in the morning with attending.     Aden Tsang MD  Internal Medicine         [1] No family history on file.

## 2025-07-30 ENCOUNTER — APPOINTMENT (OUTPATIENT)
Dept: RADIOLOGY | Facility: HOSPITAL | Age: 60
End: 2025-07-30
Payer: MEDICAID

## 2025-07-30 ENCOUNTER — HOSPITAL ENCOUNTER (EMERGENCY)
Facility: HOSPITAL | Age: 60
Discharge: HOME | End: 2025-07-30
Attending: STUDENT IN AN ORGANIZED HEALTH CARE EDUCATION/TRAINING PROGRAM
Payer: MEDICAID

## 2025-07-30 VITALS
OXYGEN SATURATION: 97 % | HEIGHT: 68 IN | TEMPERATURE: 97.3 F | HEART RATE: 98 BPM | RESPIRATION RATE: 16 BRPM | SYSTOLIC BLOOD PRESSURE: 116 MMHG | WEIGHT: 190 LBS | DIASTOLIC BLOOD PRESSURE: 74 MMHG | BODY MASS INDEX: 28.79 KG/M2

## 2025-07-30 DIAGNOSIS — L03.115 CELLULITIS OF RIGHT LOWER EXTREMITY: Primary | ICD-10-CM

## 2025-07-30 DIAGNOSIS — N17.9 AKI (ACUTE KIDNEY INJURY): ICD-10-CM

## 2025-07-30 LAB
ALBUMIN SERPL BCP-MCNC: 4.2 G/DL (ref 3.4–5)
ALP SERPL-CCNC: 104 U/L (ref 33–136)
ALT SERPL W P-5'-P-CCNC: 23 U/L (ref 10–52)
ANION GAP SERPL CALC-SCNC: 14 MMOL/L (ref 10–20)
AST SERPL W P-5'-P-CCNC: 33 U/L (ref 9–39)
BASOPHILS # BLD AUTO: 0.04 X10*3/UL (ref 0–0.1)
BASOPHILS NFR BLD AUTO: 0.4 %
BILIRUB SERPL-MCNC: 1 MG/DL (ref 0–1.2)
BUN SERPL-MCNC: 18 MG/DL (ref 6–23)
CALCIUM SERPL-MCNC: 9.8 MG/DL (ref 8.6–10.6)
CHLORIDE SERPL-SCNC: 95 MMOL/L (ref 98–107)
CO2 SERPL-SCNC: 29 MMOL/L (ref 21–32)
CREAT SERPL-MCNC: 1.89 MG/DL (ref 0.5–1.3)
CRP SERPL-MCNC: 15.22 MG/DL
EGFRCR SERPLBLD CKD-EPI 2021: 40 ML/MIN/1.73M*2
EOSINOPHIL # BLD AUTO: 0.05 X10*3/UL (ref 0–0.7)
EOSINOPHIL NFR BLD AUTO: 0.5 %
ERYTHROCYTE [DISTWIDTH] IN BLOOD BY AUTOMATED COUNT: 11.8 % (ref 11.5–14.5)
ERYTHROCYTE [SEDIMENTATION RATE] IN BLOOD BY WESTERGREN METHOD: 71 MM/H (ref 0–20)
GLUCOSE SERPL-MCNC: 90 MG/DL (ref 74–99)
HCT VFR BLD AUTO: 31.6 % (ref 41–52)
HGB BLD-MCNC: 11.4 G/DL (ref 13.5–17.5)
IMM GRANULOCYTES # BLD AUTO: 0.05 X10*3/UL (ref 0–0.7)
IMM GRANULOCYTES NFR BLD AUTO: 0.5 % (ref 0–0.9)
LYMPHOCYTES # BLD AUTO: 2.41 X10*3/UL (ref 1.2–4.8)
LYMPHOCYTES NFR BLD AUTO: 23.2 %
MCH RBC QN AUTO: 33.1 PG (ref 26–34)
MCHC RBC AUTO-ENTMCNC: 36.1 G/DL (ref 32–36)
MCV RBC AUTO: 92 FL (ref 80–100)
MONOCYTES # BLD AUTO: 0.76 X10*3/UL (ref 0.1–1)
MONOCYTES NFR BLD AUTO: 7.3 %
NEUTROPHILS # BLD AUTO: 7.07 X10*3/UL (ref 1.2–7.7)
NEUTROPHILS NFR BLD AUTO: 68.1 %
NRBC BLD-RTO: 0 /100 WBCS (ref 0–0)
PLATELET # BLD AUTO: 339 X10*3/UL (ref 150–450)
POTASSIUM SERPL-SCNC: 3.9 MMOL/L (ref 3.5–5.3)
PROT SERPL-MCNC: 8 G/DL (ref 6.4–8.2)
RBC # BLD AUTO: 3.44 X10*6/UL (ref 4.5–5.9)
SODIUM SERPL-SCNC: 134 MMOL/L (ref 136–145)
WBC # BLD AUTO: 10.4 X10*3/UL (ref 4.4–11.3)

## 2025-07-30 PROCEDURE — 85652 RBC SED RATE AUTOMATED: CPT | Performed by: NURSE PRACTITIONER

## 2025-07-30 PROCEDURE — 85025 COMPLETE CBC W/AUTO DIFF WBC: CPT | Performed by: STUDENT IN AN ORGANIZED HEALTH CARE EDUCATION/TRAINING PROGRAM

## 2025-07-30 PROCEDURE — 80053 COMPREHEN METABOLIC PANEL: CPT | Performed by: STUDENT IN AN ORGANIZED HEALTH CARE EDUCATION/TRAINING PROGRAM

## 2025-07-30 PROCEDURE — 36415 COLL VENOUS BLD VENIPUNCTURE: CPT | Performed by: STUDENT IN AN ORGANIZED HEALTH CARE EDUCATION/TRAINING PROGRAM

## 2025-07-30 PROCEDURE — 99285 EMERGENCY DEPT VISIT HI MDM: CPT | Mod: 25 | Performed by: STUDENT IN AN ORGANIZED HEALTH CARE EDUCATION/TRAINING PROGRAM

## 2025-07-30 PROCEDURE — 2500000002 HC RX 250 W HCPCS SELF ADMINISTERED DRUGS (ALT 637 FOR MEDICARE OP, ALT 636 FOR OP/ED): Performed by: NURSE PRACTITIONER

## 2025-07-30 PROCEDURE — 86140 C-REACTIVE PROTEIN: CPT | Performed by: NURSE PRACTITIONER

## 2025-07-30 PROCEDURE — 2500000001 HC RX 250 WO HCPCS SELF ADMINISTERED DRUGS (ALT 637 FOR MEDICARE OP): Performed by: NURSE PRACTITIONER

## 2025-07-30 PROCEDURE — 73630 X-RAY EXAM OF FOOT: CPT | Mod: RT

## 2025-07-30 PROCEDURE — 93971 EXTREMITY STUDY: CPT

## 2025-07-30 PROCEDURE — 2500000004 HC RX 250 GENERAL PHARMACY W/ HCPCS (ALT 636 FOR OP/ED): Performed by: NURSE PRACTITIONER

## 2025-07-30 PROCEDURE — 73630 X-RAY EXAM OF FOOT: CPT | Mod: RIGHT SIDE | Performed by: RADIOLOGY

## 2025-07-30 PROCEDURE — 73610 X-RAY EXAM OF ANKLE: CPT | Mod: RIGHT SIDE | Performed by: RADIOLOGY

## 2025-07-30 PROCEDURE — 73610 X-RAY EXAM OF ANKLE: CPT | Mod: RT

## 2025-07-30 PROCEDURE — 93971 EXTREMITY STUDY: CPT | Performed by: RADIOLOGY

## 2025-07-30 PROCEDURE — 96361 HYDRATE IV INFUSION ADD-ON: CPT

## 2025-07-30 PROCEDURE — 99285 EMERGENCY DEPT VISIT HI MDM: CPT | Performed by: NURSE PRACTITIONER

## 2025-07-30 PROCEDURE — 96374 THER/PROPH/DIAG INJ IV PUSH: CPT

## 2025-07-30 RX ORDER — SULFAMETHOXAZOLE AND TRIMETHOPRIM 800; 160 MG/1; MG/1
1 TABLET ORAL 2 TIMES DAILY
Qty: 14 TABLET | Refills: 0 | Status: SHIPPED | OUTPATIENT
Start: 2025-07-30 | End: 2025-07-30

## 2025-07-30 RX ORDER — ACETAMINOPHEN 325 MG/1
650 TABLET ORAL EVERY 6 HOURS PRN
Qty: 30 TABLET | Refills: 0 | Status: SHIPPED | OUTPATIENT
Start: 2025-07-30 | End: 2025-07-30

## 2025-07-30 RX ORDER — SULFAMETHOXAZOLE AND TRIMETHOPRIM 800; 160 MG/1; MG/1
1 TABLET ORAL ONCE
Status: COMPLETED | OUTPATIENT
Start: 2025-07-30 | End: 2025-07-30

## 2025-07-30 RX ORDER — SULFAMETHOXAZOLE AND TRIMETHOPRIM 800; 160 MG/1; MG/1
1 TABLET ORAL 2 TIMES DAILY
Qty: 14 TABLET | Refills: 0 | Status: SHIPPED | OUTPATIENT
Start: 2025-07-30 | End: 2025-08-06

## 2025-07-30 RX ORDER — CEPHALEXIN 500 MG/1
500 CAPSULE ORAL 4 TIMES DAILY
Qty: 28 CAPSULE | Refills: 0 | Status: SHIPPED | OUTPATIENT
Start: 2025-07-30 | End: 2025-07-30

## 2025-07-30 RX ORDER — KETOROLAC TROMETHAMINE 15 MG/ML
15 INJECTION, SOLUTION INTRAMUSCULAR; INTRAVENOUS ONCE
Status: COMPLETED | OUTPATIENT
Start: 2025-07-30 | End: 2025-07-30

## 2025-07-30 RX ORDER — CEPHALEXIN 250 MG/1
500 CAPSULE ORAL ONCE
Status: COMPLETED | OUTPATIENT
Start: 2025-07-30 | End: 2025-07-30

## 2025-07-30 RX ORDER — CEPHALEXIN 500 MG/1
500 CAPSULE ORAL 4 TIMES DAILY
Qty: 28 CAPSULE | Refills: 0 | Status: SHIPPED | OUTPATIENT
Start: 2025-07-30 | End: 2025-08-06

## 2025-07-30 RX ORDER — ACETAMINOPHEN 325 MG/1
650 TABLET ORAL EVERY 6 HOURS PRN
Qty: 30 TABLET | Refills: 0 | Status: SHIPPED | OUTPATIENT
Start: 2025-07-30

## 2025-07-30 RX ADMIN — SODIUM CHLORIDE 500 ML: 0.9 INJECTION, SOLUTION INTRAVENOUS at 15:27

## 2025-07-30 RX ADMIN — CEPHALEXIN 500 MG: 250 CAPSULE ORAL at 16:15

## 2025-07-30 RX ADMIN — KETOROLAC TROMETHAMINE 15 MG: 15 INJECTION, SOLUTION INTRAMUSCULAR; INTRAVENOUS at 13:21

## 2025-07-30 RX ADMIN — SULFAMETHOXAZOLE AND TRIMETHOPRIM 1 TABLET: 800; 160 TABLET ORAL at 16:15

## 2025-07-30 ASSESSMENT — PAIN SCALES - GENERAL
PAINLEVEL_OUTOF10: 10 - WORST POSSIBLE PAIN
PAINLEVEL_OUTOF10: 10 - WORST POSSIBLE PAIN

## 2025-07-30 ASSESSMENT — PAIN - FUNCTIONAL ASSESSMENT
PAIN_FUNCTIONAL_ASSESSMENT: 0-10
PAIN_FUNCTIONAL_ASSESSMENT: 0-10

## 2025-07-30 NOTE — ED PROVIDER NOTES
"Emergency Department Encounter  [facility] EMERGENCY MEDICINE    Patient: Venkat Rod  MRN: [id number]  : 1965  Date of Evaluation: 2025  ED Provider: MICHAEL Jaeger-JOCELYNE      Chief Complaint       Chief Complaint   Patient presents with    Foot Injury        Limitations to History: none  Historian: patient  Records reviewed: EMR inpatient and outpatient notes, Care Everywhere    This is a 60-year-old male with a PMH of hypertension, chronic pancreatitis, alcohol use disorder and ventral hernia status post repair who presents to the emergency room with right foot pain and swelling.  Patient states that he developed symptoms 4 days ago.  Patient is unable to recall a direct injury or fall but states that he was playing with the [employer] and he \"may of overdone it.\"  Denies any fevers or chills.  Patient has been able to ambulate despite having pain and swelling.  Denies any calf pain or swelling.  Denies any recent travel.    PMH: HTN, chronic pancreatitis, alcohol use disorder, ventral hernia  PSH: Ventral hernia repair  Allergies: NKDA  Social HX: Denies smoking, former alcohol use ( states he quit 10 days ago), denies any drug use.  Family HX: No family history pertinent to current presenting problem  Medications: Reviewed per EMR    ROS:     Review of Systems   Musculoskeletal:         + right foot swelling     14 systems reviewed and otherwise acutely negative except as in the [place].        Past History   Medical History[1]  Surgical History[2]      Medications/Allergies     Previous Medications    AMLODIPINE (NORVASC) 10 MG TABLET    Take 1 tablet (10 mg) by mouth once daily.    DICYCLOMINE (BENTYL) 20 MG TABLET    Take 0.5 tablets (10 mg) by mouth 4 times a day before meals.    FOLIC ACID (FOLVITE) 1 MG TABLET    Take 1 tablet (1 mg) by mouth once daily.    HYDROXYZINE HCL (ATARAX) 25 MG TABLET    Take 1 tablet (25 mg) by mouth every 6 hours if needed for anxiety.    " MULTIVITAMIN WITH MINERALS TABLET    Take 1 tablet by mouth once daily.    ONDANSETRON ODT (ZOFRAN-ODT) 4 MG DISINTEGRATING TABLET    Dissolve 1 tablet (4 mg) in the mouth every 8 hours if needed for nausea or vomiting.    PANTOPRAZOLE (PROTONIX) 40 MG EC TABLET    Take 1 tablet (40 mg) by mouth once daily in the morning. Take before meals for 14 days. Do not crush, chew, or split.    THIAMINE (VITAMIN B-1) 100 MG TABLET    Take 1 tablet (100 mg) by mouth once daily. Do not fill before July 23, 2025.     Allergies[3]     Physical Exam       ED Triage Vitals [07/30/25 1122]   Temperature Heart Rate Respirations BP   36.3 °C (97.3 °F) 98 16 116/74      Pulse Ox Temp src Heart Rate Source Patient Position   97 % -- -- --      BP Location FiO2 (%)     -- --       Physical Exam:    Appearance: Alert, oriented , cooperative,  in no acute distress. Well nourished & well hydrated.    Skin: Intact. Erythema to the right foot.    Eyes: PERRLA, EOMs intact.    ENT: Hearing grossly intact. External auditory canals patent, tympanic membranes intact with visible landmarks. Nares patent, mucus membranes moist. Dentition without lesions. Pharynx clear, uvula midline.     Neck: Supple, without meningismus.     Pulmonary: Clear bilaterally with good chest wall excursion. No rales, rhonchi or wheezing. No accessory muscle use or stridor.    Cardiac: Normal S1, S2 without murmur, rub, gallop or extrasystole. No JVD, Carotids without bruits.    Abdomen: Soft, nontender, active bowel sounds.  No palpable organomegaly.  No rebound or guarding.  No CVA tenderness.    Musculoskeletal: Full range of motion. No deformity. Pulses full and equal. No cyanosis, clubbing, or edema. Tenderness to the dorsal aspect of the right foot, specifically the first metatarsal.    Neurological:  Normal sensation, no weakness, no focal findings identified.    Psychiatric: Appropriate mood and affect.       Diagnostics   Labs:  Results for orders placed or  performed during the hospital encounter of 07/30/25 (from the past 24 hours)   CBC and Auto Differential   Result Value Ref Range    WBC 10.4 4.4 - 11.3 x10*3/uL    nRBC 0.0 0.0 - 0.0 /100 WBCs    RBC 3.44 (L) 4.50 - 5.90 x10*6/uL    Hemoglobin 11.4 (L) 13.5 - 17.5 g/dL    Hematocrit 31.6 (L) 41.0 - 52.0 %    MCV 92 80 - 100 fL    MCH 33.1 26.0 - 34.0 pg    MCHC 36.1 (H) 32.0 - 36.0 g/dL    RDW 11.8 11.5 - 14.5 %    Platelets 339 150 - 450 x10*3/uL    Neutrophils % 68.1 40.0 - 80.0 %    Immature Granulocytes %, Automated 0.5 0.0 - 0.9 %    Lymphocytes % 23.2 13.0 - 44.0 %    Monocytes % 7.3 2.0 - 10.0 %    Eosinophils % 0.5 0.0 - 6.0 %    Basophils % 0.4 0.0 - 2.0 %    Neutrophils Absolute 7.07 1.20 - 7.70 x10*3/uL    Immature Granulocytes Absolute, Automated 0.05 0.00 - 0.70 x10*3/uL    Lymphocytes Absolute 2.41 1.20 - 4.80 x10*3/uL    Monocytes Absolute 0.76 0.10 - 1.00 x10*3/uL    Eosinophils Absolute 0.05 0.00 - 0.70 x10*3/uL    Basophils Absolute 0.04 0.00 - 0.10 x10*3/uL   Comprehensive metabolic panel   Result Value Ref Range    Glucose 90 74 - 99 mg/dL    Sodium 134 (L) 136 - 145 mmol/L    Potassium 3.9 3.5 - 5.3 mmol/L    Chloride 95 (L) 98 - 107 mmol/L    Bicarbonate 29 21 - 32 mmol/L    Anion Gap 14 10 - 20 mmol/L    Urea Nitrogen 18 6 - 23 mg/dL    Creatinine 1.89 (H) 0.50 - 1.30 mg/dL    eGFR 40 (L) >60 mL/min/1.73m*2    Calcium 9.8 8.6 - 10.6 mg/dL    Albumin 4.2 3.4 - 5.0 g/dL    Alkaline Phosphatase 104 33 - 136 U/L    Total Protein 8.0 6.4 - 8.2 g/dL    AST 33 9 - 39 U/L    Bilirubin, Total 1.0 0.0 - 1.2 mg/dL    ALT 23 10 - 52 U/L   Sedimentation rate, automated   Result Value Ref Range    Sedimentation Rate 71 (H) 0 - 20 mm/h   C-reactive protein   Result Value Ref Range    C-Reactive Protein 15.22 (H) <1.00 mg/dL      Radiographs:  Lower extremity venous duplex right   Final Result   No evidence of acute DVT in the right lower extremity.        I personally reviewed the images/study and I agree  "with the findings   as stated by Alexandre Garcia DO PGY-3. This study was interpreted at   [place], [place].        MACRO:   None.        Signed by: Montez Hampton 7/30/2025 4:10 PM   Dictation workstation:   [id number]      XR foot right 3+ views   Final Result   1. No acute fracture or dislocation of the right ankle.   2. Prior avulsion fracture off of the tip of the medial malleolus and   lateral process of the talus.   3. Nonspecific soft tissue swelling.             I personally reviewed the images/study and I agree with the findings   as stated by Alexander Pike MD (radiology resident). This study was   interpreted at [place], [place].        MACRO:   None        Signed by: Sonido Hernandez 7/30/2025 2:32 PM   Dictation workstation:   [id number]      XR ankle right 3+ views   Final Result   1. No acute fracture or dislocation of the right ankle.   2. Prior avulsion fracture off of the tip of the medial malleolus and   lateral process of the talus.   3. Nonspecific soft tissue swelling.             I personally reviewed the images/study and I agree with the findings   as stated by Alexander Pike MD (radiology resident). This study was   interpreted at [place], [place].        MACRO:   None        Signed by: Sonido Hernandez 7/30/2025 2:32 PM   Dictation workstation:   [id number]                Assessment   In brief, Venkat Rod is a 60 y.o. male who presented to the emergency department with right foot pain and swelling.           ED Course/MDM     Diagnoses as of 07/30/25 1630   Cellulitis of right lower extremity   HUSEYIN (acute kidney injury)      Visit Vitals  /74   Pulse 98   Temp 36.3 °C (97.3 °F)   Resp 16   Ht 1.727 m (5' 8\")   Wt 86.2 kg (190 lb)   SpO2 97%   BMI 28.89 kg/m²   Smoking Status Never   BSA 2.03 m²       Medications   ketorolac (Toradol) injection " 15 mg (15 mg intravenous Given 7/30/25 1321)   sodium chloride 0.9 % bolus 500 mL (0 mL intravenous Stopped 7/30/25 1602)   cephalexin (Keflex) capsule 500 mg (500 mg oral Given 7/30/25 1615)   sulfamethoxazole-trimethoprim (Bactrim DS) 800-160 mg per tablet 1 tablet (1 tablet oral Given 7/30/25 1615)       Patient remained stable while in the emergency department. Previous outpatient and ED records were reviewed. Outside records were reviewed.  Differentials include fracture, sprain, cellulitis, gout, musculoskeletal pain.  IV established and labs obtained.  CBC with a normal white blood cell count of 10.4.  Hemoglobin and hematocrit was slightly low but stable at 11.4 and 31.6.  Comprehensive metabolic panel with a mildly elevated creatinine of 1.89, otherwise unremarkable.  ESR was elevated at 71 and CRP was 15.22.  Right lower extremity ultrasound was negative for DVT.  X-ray of the foot and ankle shows no acute fracture or dislocation of the right ankle.  Prior avulsion fracture of the tip of the medial malleolus and lateral process of the talus.  Nonspecific soft tissue swelling.  Patient received Toradol 15 mg IV once prior to lab results.  It appears the patient has a mild HUSEYIN and received normal saline 500 mL liter IV bolus.  Patient will be treated for cellulitis and received 1 dose of Keflex and Bactrim while in the emergency room.  There is consideration that the patient may have gout as well.  Patient does not have a history of gout. Discussed admission vs discharge with the patient. Patient is requesting to leave. Able to eat and drink.  Patient was discharged home with a prescription for Tylenol, Bactrim and Keflex for the treatment of cellulitis.  NSAIDs were avoided due to the new HUSEYIN.  Patient was advised to follow-up with his primary care doctor return the emergency room with worsening symptoms.    Final Impression      1. Cellulitis of right lower extremity    2. HUSEYIN (acute kidney injury)           DISPOSITION  Disposition: Discharged home    Comment: Please note this report has been produced using speech recognition software and may contain errors related to that system including errors in grammar, punctuation, and spelling, as well as words and phrases that may be inappropriate.  If there are any questions or concerns please feel free to contact the dictating provider for clarification.    MICHAEL Jaeger-JOCELYNE             [1]   Past Medical History:  Diagnosis Date    Hypertension    [2] No past surgical history on file.  [3] No Known Allergies       RED Jaeger  07/30/25 7716

## 2025-07-30 NOTE — ED TRIAGE NOTES
Pt states that about 4 days ago he noticed his R foot becoming swollen. It is hot to the touch. States the swelling has worsened over the past couple of days. Denies injury/trauma.

## 2025-08-23 ENCOUNTER — APPOINTMENT (OUTPATIENT)
Dept: RADIOLOGY | Facility: HOSPITAL | Age: 60
End: 2025-08-23
Payer: MEDICAID

## 2025-08-23 ENCOUNTER — HOSPITAL ENCOUNTER (EMERGENCY)
Facility: HOSPITAL | Age: 60
Discharge: HOME | End: 2025-08-23
Attending: EMERGENCY MEDICINE
Payer: MEDICAID

## 2025-08-23 VITALS
DIASTOLIC BLOOD PRESSURE: 77 MMHG | SYSTOLIC BLOOD PRESSURE: 149 MMHG | TEMPERATURE: 98.4 F | BODY MASS INDEX: 28.79 KG/M2 | OXYGEN SATURATION: 98 % | HEART RATE: 68 BPM | HEIGHT: 68 IN | WEIGHT: 190 LBS | RESPIRATION RATE: 18 BRPM

## 2025-08-23 DIAGNOSIS — M79.671 RIGHT FOOT PAIN: Primary | ICD-10-CM

## 2025-08-23 LAB
ALBUMIN SERPL BCP-MCNC: 4.1 G/DL (ref 3.4–5)
ALP SERPL-CCNC: 82 U/L (ref 33–136)
ALT SERPL W P-5'-P-CCNC: 18 U/L (ref 10–52)
ANION GAP SERPL CALC-SCNC: 11 MMOL/L (ref 10–20)
AST SERPL W P-5'-P-CCNC: 27 U/L (ref 9–39)
BASOPHILS # BLD AUTO: 0.03 X10*3/UL (ref 0–0.1)
BASOPHILS NFR BLD AUTO: 0.4 %
BILIRUB SERPL-MCNC: 0.6 MG/DL (ref 0–1.2)
BUN SERPL-MCNC: 12 MG/DL (ref 6–23)
CALCIUM SERPL-MCNC: 9.6 MG/DL (ref 8.6–10.6)
CHLORIDE SERPL-SCNC: 100 MMOL/L (ref 98–107)
CO2 SERPL-SCNC: 31 MMOL/L (ref 21–32)
CREAT SERPL-MCNC: 1.05 MG/DL (ref 0.5–1.3)
EGFRCR SERPLBLD CKD-EPI 2021: 81 ML/MIN/1.73M*2
EOSINOPHIL # BLD AUTO: 0.08 X10*3/UL (ref 0–0.7)
EOSINOPHIL NFR BLD AUTO: 1.1 %
ERYTHROCYTE [DISTWIDTH] IN BLOOD BY AUTOMATED COUNT: 12 % (ref 11.5–14.5)
GLUCOSE SERPL-MCNC: 87 MG/DL (ref 74–99)
HCT VFR BLD AUTO: 30.5 % (ref 41–52)
HGB BLD-MCNC: 10.9 G/DL (ref 13.5–17.5)
IMM GRANULOCYTES # BLD AUTO: 0.04 X10*3/UL (ref 0–0.7)
IMM GRANULOCYTES NFR BLD AUTO: 0.5 % (ref 0–0.9)
LYMPHOCYTES # BLD AUTO: 2.15 X10*3/UL (ref 1.2–4.8)
LYMPHOCYTES NFR BLD AUTO: 28.7 %
MCH RBC QN AUTO: 31.8 PG (ref 26–34)
MCHC RBC AUTO-ENTMCNC: 35.7 G/DL (ref 32–36)
MCV RBC AUTO: 89 FL (ref 80–100)
MONOCYTES # BLD AUTO: 0.47 X10*3/UL (ref 0.1–1)
MONOCYTES NFR BLD AUTO: 6.3 %
NEUTROPHILS # BLD AUTO: 4.73 X10*3/UL (ref 1.2–7.7)
NEUTROPHILS NFR BLD AUTO: 63 %
NRBC BLD-RTO: 0 /100 WBCS (ref 0–0)
PLATELET # BLD AUTO: 193 X10*3/UL (ref 150–450)
POTASSIUM SERPL-SCNC: 4.2 MMOL/L (ref 3.5–5.3)
PROT SERPL-MCNC: 7.1 G/DL (ref 6.4–8.2)
RBC # BLD AUTO: 3.43 X10*6/UL (ref 4.5–5.9)
SODIUM SERPL-SCNC: 138 MMOL/L (ref 136–145)
WBC # BLD AUTO: 7.5 X10*3/UL (ref 4.4–11.3)

## 2025-08-23 PROCEDURE — 36415 COLL VENOUS BLD VENIPUNCTURE: CPT

## 2025-08-23 PROCEDURE — 73630 X-RAY EXAM OF FOOT: CPT | Mod: RT

## 2025-08-23 PROCEDURE — 73700 CT LOWER EXTREMITY W/O DYE: CPT | Mod: RIGHT SIDE | Performed by: RADIOLOGY

## 2025-08-23 PROCEDURE — 2500000004 HC RX 250 GENERAL PHARMACY W/ HCPCS (ALT 636 FOR OP/ED): Mod: SE

## 2025-08-23 PROCEDURE — 99285 EMERGENCY DEPT VISIT HI MDM: CPT | Mod: 25 | Performed by: EMERGENCY MEDICINE

## 2025-08-23 PROCEDURE — 73120 X-RAY EXAM OF HAND: CPT | Mod: BILATERAL PROCEDURE | Performed by: RADIOLOGY

## 2025-08-23 PROCEDURE — 85025 COMPLETE CBC W/AUTO DIFF WBC: CPT

## 2025-08-23 PROCEDURE — 73630 X-RAY EXAM OF FOOT: CPT | Mod: RIGHT SIDE | Performed by: RADIOLOGY

## 2025-08-23 PROCEDURE — 96360 HYDRATION IV INFUSION INIT: CPT

## 2025-08-23 PROCEDURE — 80053 COMPREHEN METABOLIC PANEL: CPT

## 2025-08-23 PROCEDURE — 73700 CT LOWER EXTREMITY W/O DYE: CPT | Mod: RT

## 2025-08-23 PROCEDURE — 96361 HYDRATE IV INFUSION ADD-ON: CPT

## 2025-08-23 PROCEDURE — 73120 X-RAY EXAM OF HAND: CPT | Mod: 50

## 2025-08-23 RX ADMIN — SODIUM CHLORIDE 1000 ML: 0.9 INJECTION, SOLUTION INTRAVENOUS at 15:50

## 2025-08-23 ASSESSMENT — PAIN DESCRIPTION - LOCATION: LOCATION: FOOT

## 2025-08-23 ASSESSMENT — PAIN DESCRIPTION - PROGRESSION: CLINICAL_PROGRESSION: NOT CHANGED

## 2025-08-23 ASSESSMENT — PAIN DESCRIPTION - FREQUENCY: FREQUENCY: CONSTANT/CONTINUOUS

## 2025-08-23 ASSESSMENT — PAIN DESCRIPTION - ORIENTATION: ORIENTATION: RIGHT

## 2025-08-23 ASSESSMENT — PAIN SCALES - GENERAL: PAINLEVEL_OUTOF10: 10 - WORST POSSIBLE PAIN

## 2025-08-23 ASSESSMENT — PAIN - FUNCTIONAL ASSESSMENT: PAIN_FUNCTIONAL_ASSESSMENT: 0-10

## 2025-08-23 ASSESSMENT — PAIN DESCRIPTION - ONSET: ONSET: ONGOING

## 2025-08-23 ASSESSMENT — PAIN DESCRIPTION - PAIN TYPE: TYPE: ACUTE PAIN
